# Patient Record
Sex: FEMALE | Race: WHITE | Employment: FULL TIME | URBAN - METROPOLITAN AREA
[De-identification: names, ages, dates, MRNs, and addresses within clinical notes are randomized per-mention and may not be internally consistent; named-entity substitution may affect disease eponyms.]

---

## 2017-03-23 ENCOUNTER — HOSPITAL ENCOUNTER (OUTPATIENT)
Dept: PREADMISSION TESTING | Age: 52
Discharge: HOME | End: 2017-03-23
Payer: COMMERCIAL

## 2017-03-23 LAB
BASOPHILS # BLD AUTO: 0.1 K/UL (ref 0–0.06)
BASOPHILS # BLD: 1 % (ref 0–2)
DIFFERENTIAL METHOD BLD: ABNORMAL
EOSINOPHIL # BLD: 0.2 K/UL (ref 0–0.4)
EOSINOPHIL NFR BLD: 3 % (ref 0–5)
ERYTHROCYTE [DISTWIDTH] IN BLOOD BY AUTOMATED COUNT: 16.1 % (ref 11.6–14.5)
HCT VFR BLD AUTO: 26.2 % (ref 35–45)
HGB BLD-MCNC: 8.2 G/DL (ref 12–16)
LYMPHOCYTES # BLD AUTO: 26 % (ref 21–52)
LYMPHOCYTES # BLD: 1.4 K/UL (ref 0.9–3.6)
MCH RBC QN AUTO: 28.5 PG (ref 24–34)
MCHC RBC AUTO-ENTMCNC: 31.3 G/DL (ref 31–37)
MCV RBC AUTO: 91 FL (ref 74–97)
MONOCYTES # BLD: 0.6 K/UL (ref 0.05–1.2)
MONOCYTES NFR BLD AUTO: 11 % (ref 3–10)
NEUTS SEG # BLD: 3.1 K/UL (ref 1.8–8)
NEUTS SEG NFR BLD AUTO: 59 % (ref 40–73)
PLATELET # BLD AUTO: 453 K/UL (ref 135–420)
PMV BLD AUTO: 9.5 FL (ref 9.2–11.8)
RBC # BLD AUTO: 2.88 M/UL (ref 4.2–5.3)
RPR SER QL: NONREACTIVE
WBC # BLD AUTO: 5.3 K/UL (ref 4.6–13.2)

## 2017-03-23 PROCEDURE — 36415 COLL VENOUS BLD VENIPUNCTURE: CPT | Performed by: OBSTETRICS & GYNECOLOGY

## 2017-03-23 PROCEDURE — 86900 BLOOD TYPING SEROLOGIC ABO: CPT | Performed by: OBSTETRICS & GYNECOLOGY

## 2017-03-23 PROCEDURE — 85025 COMPLETE CBC W/AUTO DIFF WBC: CPT | Performed by: OBSTETRICS & GYNECOLOGY

## 2017-03-23 PROCEDURE — 86592 SYPHILIS TEST NON-TREP QUAL: CPT | Performed by: OBSTETRICS & GYNECOLOGY

## 2017-03-24 LAB
ABO + RH BLD: NORMAL
BLOOD GROUP ANTIBODIES SERPL: NORMAL
SPECIMEN EXP DATE BLD: NORMAL

## 2017-03-27 NOTE — H&P
[address] [place] [address]  PRE-OP HISTORY AND PHYSICAL    Name:  Jose Eduardo Vicente  MR#:  [id number]  :  1965  Account #:  [de-identified]  Date of Adm:  2017      CHIEF COMPLAINT  1. Dysfunctional uterine bleeding. 2. Known late stage endometriosis. HISTORY OF PRESENT ILLNESS: The patient is a 78-year-old   2, para 2 by  section,  female who is in with  a history of increasing irregular bleeding, which has precluded her Pap  smear this year, but Pap smears in the past 2 years have all been  class 1 with negative cultures. Upon ultrasound, patient has a 9 cm  uterus, does have the possibility of adenomyosis versus a question of  a fibroid within the intramural area of the uterus and the fundus. Otherwise benign appearance. The blush which may be consistent with  adenomyosis is 2.9 cm. Left ovary was normal at 2.65 x 2.40 cm. The  right ovary showed a 2.98 cm benign cyst without tenderness or  irregularity and the cul-de-sac was without fluid. The endometrium was  slightly thickened. The patient is now in at this time for exam under  anesthesia, fractional dilatation and curettage of the uterus. The patient in  had a diagnostic and operative laser laparoscopy  and hysteroscopic resection of endometrial polyps. At that time  specimens from the uterus including the polyp lesions as well as  fractional D and C revealed evidence for chronic endometritis. The  patient at that time was also on antibiotic coverage. The patient was on  Ceftin and Vibra-Tabs. Because of the issues surrounding that finding,  the patient is now going to be on Ancef, Flagyl therapy for coverage of  this particular procedure. The patient is now in at this time for exam  under anesthesia, fractional dilatation and curettage of the uterus.     OTHER SIGNIFICANT PAST MEDICAL HISTORY: Two   sections, the last being in , laser laparoscopy in  with the  findings minimal scarification to the old bladder flap, uterus about 10.5  cm, right distal tube was removed because of what appeared to be  endometriosis and pathology did confirm distal tube having  endometriotic implants. There was enterolysis of the rectosigmoid area  on the left from endometriosis, and endometriosis left and right ovary  and polycystic ovaries were treated with lasers. There may be a  smaller residual at the left uterosacral ligament of the uterus, but it is  not tinting to the rectosigmoid area. The appendix was anteflexed and  gallbladder head and liver were otherwise normal. The patient currently  is having some menopausal symptoms as far as going for definitive  surgery. At the present, this is not desired at this time. The patient  does have tolerable night sweats. OTHER SIGNIFICANT HISTORY: Spouse's name is listed as Sigifredo Ch. It should be mentioned, the patient did have a urinary tract infection on  admission with her 2004 study. Urinalysis will be done at this time. If  necessary, this will also be treated postoperatively other than the  Ancef, Flagyl therapy as given for the prophylaxis. ALLERGIES: NO KNOWN DRUG ALLERGIES. FAMILY HISTORY: Grandmother  of breast cancer. Family history  of twins. SYSTEM REVIEW: Otherwise, noncontributory and/or normal except  for given above. PHYSICAL EXAMINATION  GENERAL: Well-developed, well-nourished,  female. VITAL SIGNS: 5 feet 3 inches tall, weight 169 pounds. Blood pressure  137/89. HEAD, EYE, EAR, NOSE, AND THROAT: Normal.  CHEST: Clear. BREASTS: Without extraneous masses. CARDIAC: Regular sinus rhythm without murmur. ABDOMEN: Reveals a well-healed Pfannenstiel incision site. PELVIC: Examination is as in the present illness.     The rest of the exam including neurologic is otherwise normal.        MD AVERY Doss / MARIA FERNANDA  D:  2017   10:27  T:  2017   11:00  Job #:  [id number]    Fax [phone]    Outpatient Surgery

## 2017-03-30 ENCOUNTER — ANESTHESIA EVENT (OUTPATIENT)
Dept: SURGERY | Age: 52
End: 2017-03-30
Payer: COMMERCIAL

## 2017-03-30 PROBLEM — N93.8 DUB (DYSFUNCTIONAL UTERINE BLEEDING): Status: ACTIVE | Noted: 2017-03-30

## 2017-03-30 PROBLEM — N92.4 MENORRHAGIA, PREMENOPAUSAL: Status: ACTIVE | Noted: 2017-03-30

## 2017-03-30 NOTE — DISCHARGE INSTRUCTIONS
DISCHARGE SUMMARY from Nurse    The following personal items are in your possession at time of discharge:    Dental Appliances: None  Visual Aid: Glasses, With patient     Home Medications: None  Jewelry: None  Clothing: Pants, Shirt, Undergarments, Footwear, Socks, Jacket/Coat (with Carla Sunny)  Other Valuables: Purse, Eyeglasses, Cell Phone (with Carlajess Correa)             PATIENT INSTRUCTIONS:    After general anesthesia or intravenous sedation, for 24 hours or while taking prescription Narcotics:  · Limit your activities  · Do not drive and operate hazardous machinery  · Do not make important personal or business decisions  · Do  not drink alcoholic beverages  · If you have not urinated within 8 hours after discharge, please contact your surgeon on call. Report the following to your surgeon:  · Excessive pain, swelling, redness or odor of or around the surgical area  · Temperature over 100.5  · Nausea and vomiting lasting longer than 4 hours or if unable to take medications  · Any signs of decreased circulation or nerve impairment to extremity: change in color, persistent  numbness, tingling, coldness or increase pain  · Any questions        What to do at Home:  Regular diet  Ok to shower  Pelvic rest for 2 weeks  Return to office in 2 weeks call for appt    If you experience any of the following symptoms heavy bleeding, fevers, severe pain, please follow up with dr Rufino Davalos. *  Please give a list of your current medications to your Primary Care Provider. *  Please update this list whenever your medications are discontinued, doses are      changed, or new medications (including over-the-counter products) are added. *  Please carry medication information at all times in case of emergency situations.           These are general instructions for a healthy lifestyle:    No smoking/ No tobacco products/ Avoid exposure to second hand smoke    Surgeon General's Warning:  Quitting smoking now greatly reduces serious risk to your health. Obesity, smoking, and sedentary lifestyle greatly increases your risk for illness    A healthy diet, regular physical exercise & weight monitoring are important for maintaining a healthy lifestyle    You may be retaining fluid if you have a history of heart failure or if you experience any of the following symptoms:  Weight gain of 3 pounds or more overnight or 5 pounds in a week, increased swelling in our hands or feet or shortness of breath while lying flat in bed. Please call your doctor as soon as you notice any of these symptoms; do not wait until your next office visit. Recognize signs and symptoms of STROKE:    F-face looks uneven    A-arms unable to move or move unevenly    S-speech slurred or non-existent    T-time-call 911 as soon as signs and symptoms begin-DO NOT go       Back to bed or wait to see if you get better-TIME IS BRAIN. Warning Signs of HEART ATTACK     Call 911 if you have these symptoms:   Chest discomfort. Most heart attacks involve discomfort in the center of the chest that lasts more than a few minutes, or that goes away and comes back. It can feel like uncomfortable pressure, squeezing, fullness, or pain.  Discomfort in other areas of the upper body. Symptoms can include pain or discomfort in one or both arms, the back, neck, jaw, or stomach.  Shortness of breath with or without chest discomfort.  Other signs may include breaking out in a cold sweat, nausea, or lightheadedness. Don't wait more than five minutes to call 911 - MINUTES MATTER! Fast action can save your life. Calling 911 is almost always the fastest way to get lifesaving treatment. Emergency Medical Services staff can begin treatment when they arrive -- up to an hour sooner than if someone gets to the hospital by car. The discharge information has been reviewed with the patient and caregiver. The patient and caregiver verbalized understanding.     Discharge medications reviewed with the patient and caregiver and appropriate educational materials and side effects teaching were provided. Dilation and Curettage (D&C): What to Expect at Home  Your Recovery  Dilation and curettage (D&C) is a procedure to remove tissue from the inside of the uterus. The doctor used a curved tool, called a curette, to gently scrape tissue from your uterus. You are likely to have a backache, or cramps similar to menstrual cramps, and pass small clots of blood from your vagina for the first few days. You may continue to have light vaginal bleeding for several weeks after the procedure. You will probably be able to go back to most of your normal activities in 1 or 2 days. This care sheet gives you a general idea about how long it will take for you to recover. But each person recovers at a different pace. Follow the steps below to get better as quickly as possible. How can you care for yourself at home? Activity  · Rest when you feel tired. Getting enough sleep will help you recover. · Avoid strenuous activities, such as bicycle riding, jogging, weight lifting, or aerobic exercise, until your doctor says it is okay. · Most women are able to return to work the day after the procedure. · You may have some light vaginal bleeding. Wear sanitary pads if needed. Do not douche or use tampons for 2 weeks or until your doctor says it is okay. · Ask your doctor when it is okay for you to have sex. · If you could become pregnant, talk about birth control with your doctor. Do not try to become pregnant until your doctor says it is okay. Diet  · You can eat your normal diet. If your stomach is upset, try bland, low-fat foods like plain rice, broiled chicken, toast, and yogurt. · Drink plenty of fluids (unless your doctor tells you not to). Medicines  · Take pain medicines exactly as directed. ¨ If the doctor gave you a prescription medicine for pain, take it as prescribed.   ¨ If you are not taking a prescription pain medicine, ask your doctor if you can take an over-the-counter medicine. · If you think your pain medicine is making you sick to your stomach:  ¨ Take your medicine after meals (unless your doctor has told you not to). ¨ Ask your doctor for a different pain medicine. · If your doctor prescribed antibiotics, take them as directed. Do not stop taking them just because you feel better. You need to take the full course of antibiotics. Follow-up care is a key part of your treatment and safety. Be sure to make and go to all appointments, and call your doctor if you are having problems. Its also a good idea to know your test results and keep a list of the medicines you take. When should you call for help? Call 911 anytime you think you may need emergency care. For example, call if:  · You passed out (lost consciousness). · You have severe trouble breathing. · You have chest pain and shortness of breath, or you cough up blood. · You have severe pain in your belly. Call your doctor now or seek immediate medical care if:  · You have bright red vaginal bleeding that soaks one or more pads each hour for 2 or more hours. · You pass blood clots that are larger than a golf ball. · You have vaginal discharge that smells bad. · You are sick to your stomach or cannot keep fluids down. · You have pain that does not get better after you take pain medicine. · You have pain that is getting worse 2 days after the procedure. · You have a fever over 100°F.  · Your belly feels tender, or full and hard. Watch closely for changes in your health, and be sure to contact your doctor if:  · You do not get better as expected. Where can you learn more? Go to [employer]  Enter D453 in the search box to learn more about \"Dilation and Curettage (D&C): What to Expect at Home. \"   © 5999-9707 [employer].  Care instructions adapted under license by [facility] (which disclaims liability or warranty for this information). This care instruction is for use with your licensed healthcare professional. If you have questions about a medical condition or this instruction, always ask your healthcare professional. [address] any warranty or liability for your use of this information.   Content Version: 2.7.573364; Last Revised: February 19, 2013    Patient armband removed and shredded

## 2017-03-30 NOTE — INTERVAL H&P NOTE
H&P Update:  Chester Horton was seen and examined. History and physical has been reviewed.  Significant clinical changes have occurred as noted:  hct 26%,hgb 8.2/ 5300=wbc yxay=258s    Signed By: Jeffrey Rodarte MD     March 30, 2017 11:58 AM

## 2017-03-31 ENCOUNTER — HOSPITAL ENCOUNTER (OUTPATIENT)
Age: 52
Setting detail: OUTPATIENT SURGERY
Discharge: HOME | End: 2017-03-31
Attending: OBSTETRICS & GYNECOLOGY | Admitting: OBSTETRICS & GYNECOLOGY
Payer: COMMERCIAL

## 2017-03-31 ENCOUNTER — ANESTHESIA (OUTPATIENT)
Dept: SURGERY | Age: 52
End: 2017-03-31
Payer: COMMERCIAL

## 2017-03-31 ENCOUNTER — SURGERY (OUTPATIENT)
Age: 52
End: 2017-03-31

## 2017-03-31 VITALS
SYSTOLIC BLOOD PRESSURE: 117 MMHG | HEART RATE: 70 BPM | RESPIRATION RATE: 12 BRPM | HEIGHT: 63 IN | BODY MASS INDEX: 29.98 KG/M2 | WEIGHT: 169.2 LBS | TEMPERATURE: 97.4 F | DIASTOLIC BLOOD PRESSURE: 72 MMHG | OXYGEN SATURATION: 100 %

## 2017-03-31 PROBLEM — D25.9 FIBROID UTERUS: Status: RESOLVED | Noted: 2017-03-31 | Resolved: 2017-03-31

## 2017-03-31 PROBLEM — N93.8 DUB (DYSFUNCTIONAL UTERINE BLEEDING): Status: RESOLVED | Noted: 2017-03-30 | Resolved: 2017-03-31

## 2017-03-31 PROBLEM — D25.9 FIBROID UTERUS: Status: ACTIVE | Noted: 2017-03-31

## 2017-03-31 LAB
BASOPHILS # BLD AUTO: 0 K/UL (ref 0–0.06)
BASOPHILS # BLD: 1 % (ref 0–2)
DIFFERENTIAL METHOD BLD: ABNORMAL
EOSINOPHIL # BLD: 0.1 K/UL (ref 0–0.4)
EOSINOPHIL NFR BLD: 3 % (ref 0–5)
ERYTHROCYTE [DISTWIDTH] IN BLOOD BY AUTOMATED COUNT: 15.3 % (ref 11.6–14.5)
HCG SERPL QL: NEGATIVE
HCT VFR BLD AUTO: 24.3 % (ref 35–45)
HGB BLD-MCNC: 7.3 G/DL (ref 12–16)
LYMPHOCYTES # BLD AUTO: 18 % (ref 21–52)
LYMPHOCYTES # BLD: 0.7 K/UL (ref 0.9–3.6)
MCH RBC QN AUTO: 26.4 PG (ref 24–34)
MCHC RBC AUTO-ENTMCNC: 30 G/DL (ref 31–37)
MCV RBC AUTO: 87.7 FL (ref 74–97)
MONOCYTES # BLD: 0.5 K/UL (ref 0.05–1.2)
MONOCYTES NFR BLD AUTO: 11 % (ref 3–10)
NEUTS SEG # BLD: 2.7 K/UL (ref 1.8–8)
NEUTS SEG NFR BLD AUTO: 67 % (ref 40–73)
PLATELET # BLD AUTO: 308 K/UL (ref 135–420)
PMV BLD AUTO: 9.2 FL (ref 9.2–11.8)
RBC # BLD AUTO: 2.77 M/UL (ref 4.2–5.3)
WBC # BLD AUTO: 4 K/UL (ref 4.6–13.2)

## 2017-03-31 PROCEDURE — 74011250636 HC RX REV CODE- 250/636: Performed by: ANESTHESIOLOGY

## 2017-03-31 PROCEDURE — 77030005537 HC CATH URETH BARD -A: Performed by: OBSTETRICS & GYNECOLOGY

## 2017-03-31 PROCEDURE — 74011250636 HC RX REV CODE- 250/636

## 2017-03-31 PROCEDURE — 85025 COMPLETE CBC W/AUTO DIFF WBC: CPT | Performed by: OBSTETRICS & GYNECOLOGY

## 2017-03-31 PROCEDURE — 76210000016 HC OR PH I REC 1 TO 1.5 HR: Performed by: OBSTETRICS & GYNECOLOGY

## 2017-03-31 PROCEDURE — 77030012508 HC MSK AIRWY LMA AMBU -A: Performed by: ANESTHESIOLOGY

## 2017-03-31 PROCEDURE — 76210000021 HC REC RM PH II 0.5 TO 1 HR: Performed by: OBSTETRICS & GYNECOLOGY

## 2017-03-31 PROCEDURE — 76010000138 HC OR TIME 0.5 TO 1 HR: Performed by: OBSTETRICS & GYNECOLOGY

## 2017-03-31 PROCEDURE — 76060000032 HC ANESTHESIA 0.5 TO 1 HR: Performed by: OBSTETRICS & GYNECOLOGY

## 2017-03-31 PROCEDURE — 88305 TISSUE EXAM BY PATHOLOGIST: CPT | Performed by: OBSTETRICS & GYNECOLOGY

## 2017-03-31 PROCEDURE — 74011250636 HC RX REV CODE- 250/636: Performed by: OBSTETRICS & GYNECOLOGY

## 2017-03-31 PROCEDURE — 84703 CHORIONIC GONADOTROPIN ASSAY: CPT | Performed by: OBSTETRICS & GYNECOLOGY

## 2017-03-31 PROCEDURE — 77030020782 HC GWN BAIR PAWS FLX 3M -B: Performed by: OBSTETRICS & GYNECOLOGY

## 2017-03-31 PROCEDURE — 36415 COLL VENOUS BLD VENIPUNCTURE: CPT | Performed by: OBSTETRICS & GYNECOLOGY

## 2017-03-31 RX ORDER — DEXAMETHASONE SODIUM PHOSPHATE 4 MG/ML
INJECTION, SOLUTION INTRA-ARTICULAR; INTRALESIONAL; INTRAMUSCULAR; INTRAVENOUS; SOFT TISSUE AS NEEDED
Status: DISCONTINUED | OUTPATIENT
Start: 2017-03-31 | End: 2017-03-31 | Stop reason: HOSPADM

## 2017-03-31 RX ORDER — MIDAZOLAM HYDROCHLORIDE 1 MG/ML
INJECTION, SOLUTION INTRAMUSCULAR; INTRAVENOUS AS NEEDED
Status: DISCONTINUED | OUTPATIENT
Start: 2017-03-31 | End: 2017-03-31 | Stop reason: HOSPADM

## 2017-03-31 RX ORDER — PROPOFOL 10 MG/ML
INJECTION, EMULSION INTRAVENOUS AS NEEDED
Status: DISCONTINUED | OUTPATIENT
Start: 2017-03-31 | End: 2017-03-31 | Stop reason: HOSPADM

## 2017-03-31 RX ORDER — KETOROLAC TROMETHAMINE 30 MG/ML
INJECTION, SOLUTION INTRAMUSCULAR; INTRAVENOUS AS NEEDED
Status: DISCONTINUED | OUTPATIENT
Start: 2017-03-31 | End: 2017-03-31 | Stop reason: HOSPADM

## 2017-03-31 RX ORDER — ALBUTEROL SULFATE 0.83 MG/ML
2.5 SOLUTION RESPIRATORY (INHALATION) AS NEEDED
Status: DISCONTINUED | OUTPATIENT
Start: 2017-03-31 | End: 2017-03-31 | Stop reason: HOSPADM

## 2017-03-31 RX ORDER — FENTANYL CITRATE 50 UG/ML
INJECTION, SOLUTION INTRAMUSCULAR; INTRAVENOUS AS NEEDED
Status: DISCONTINUED | OUTPATIENT
Start: 2017-03-31 | End: 2017-03-31 | Stop reason: HOSPADM

## 2017-03-31 RX ORDER — DIPHENHYDRAMINE HYDROCHLORIDE 50 MG/ML
12.5 INJECTION, SOLUTION INTRAMUSCULAR; INTRAVENOUS
Status: DISCONTINUED | OUTPATIENT
Start: 2017-03-31 | End: 2017-03-31 | Stop reason: HOSPADM

## 2017-03-31 RX ORDER — DEXTROSE 50 % IN WATER (D50W) INTRAVENOUS SYRINGE
25-50 AS NEEDED
Status: DISCONTINUED | OUTPATIENT
Start: 2017-03-31 | End: 2017-03-31 | Stop reason: HOSPADM

## 2017-03-31 RX ORDER — CEFAZOLIN SODIUM 2 G/50ML
2 SOLUTION INTRAVENOUS ONCE
Status: COMPLETED | OUTPATIENT
Start: 2017-03-31 | End: 2017-03-31

## 2017-03-31 RX ORDER — SODIUM CHLORIDE, SODIUM LACTATE, POTASSIUM CHLORIDE, CALCIUM CHLORIDE 600; 310; 30; 20 MG/100ML; MG/100ML; MG/100ML; MG/100ML
1000 INJECTION, SOLUTION INTRAVENOUS CONTINUOUS
Status: DISCONTINUED | OUTPATIENT
Start: 2017-03-31 | End: 2017-03-31 | Stop reason: HOSPADM

## 2017-03-31 RX ORDER — HYDROMORPHONE HYDROCHLORIDE 1 MG/ML
0.5 INJECTION, SOLUTION INTRAMUSCULAR; INTRAVENOUS; SUBCUTANEOUS
Status: DISCONTINUED | OUTPATIENT
Start: 2017-03-31 | End: 2017-03-31 | Stop reason: HOSPADM

## 2017-03-31 RX ORDER — MAGNESIUM SULFATE 100 %
4 CRYSTALS MISCELLANEOUS AS NEEDED
Status: DISCONTINUED | OUTPATIENT
Start: 2017-03-31 | End: 2017-03-31 | Stop reason: HOSPADM

## 2017-03-31 RX ORDER — FENTANYL CITRATE 50 UG/ML
25 INJECTION, SOLUTION INTRAMUSCULAR; INTRAVENOUS AS NEEDED
Status: DISCONTINUED | OUTPATIENT
Start: 2017-03-31 | End: 2017-03-31 | Stop reason: HOSPADM

## 2017-03-31 RX ORDER — NALOXONE HYDROCHLORIDE 0.4 MG/ML
0.2 INJECTION, SOLUTION INTRAMUSCULAR; INTRAVENOUS; SUBCUTANEOUS AS NEEDED
Status: DISCONTINUED | OUTPATIENT
Start: 2017-03-31 | End: 2017-03-31 | Stop reason: HOSPADM

## 2017-03-31 RX ORDER — FLUMAZENIL 0.1 MG/ML
0.2 INJECTION INTRAVENOUS
Status: DISCONTINUED | OUTPATIENT
Start: 2017-03-31 | End: 2017-03-31 | Stop reason: HOSPADM

## 2017-03-31 RX ORDER — METRONIDAZOLE 500 MG/100ML
500 INJECTION, SOLUTION INTRAVENOUS ONCE
Status: DISCONTINUED | OUTPATIENT
Start: 2017-03-31 | End: 2017-03-31 | Stop reason: HOSPADM

## 2017-03-31 RX ORDER — SODIUM CHLORIDE, SODIUM LACTATE, POTASSIUM CHLORIDE, CALCIUM CHLORIDE 600; 310; 30; 20 MG/100ML; MG/100ML; MG/100ML; MG/100ML
125 INJECTION, SOLUTION INTRAVENOUS CONTINUOUS
Status: DISCONTINUED | OUTPATIENT
Start: 2017-03-31 | End: 2017-03-31 | Stop reason: HOSPADM

## 2017-03-31 RX ORDER — SODIUM CHLORIDE 0.9 % (FLUSH) 0.9 %
5-10 SYRINGE (ML) INJECTION AS NEEDED
Status: DISCONTINUED | OUTPATIENT
Start: 2017-03-31 | End: 2017-03-31 | Stop reason: HOSPADM

## 2017-03-31 RX ORDER — ONDANSETRON 2 MG/ML
INJECTION INTRAMUSCULAR; INTRAVENOUS AS NEEDED
Status: DISCONTINUED | OUTPATIENT
Start: 2017-03-31 | End: 2017-03-31 | Stop reason: HOSPADM

## 2017-03-31 RX ADMIN — MIDAZOLAM HYDROCHLORIDE 2 MG: 1 INJECTION, SOLUTION INTRAMUSCULAR; INTRAVENOUS at 08:13

## 2017-03-31 RX ADMIN — FENTANYL CITRATE 25 MCG: 50 INJECTION, SOLUTION INTRAMUSCULAR; INTRAVENOUS at 08:34

## 2017-03-31 RX ADMIN — FENTANYL CITRATE 25 MCG: 50 INJECTION, SOLUTION INTRAMUSCULAR; INTRAVENOUS at 09:50

## 2017-03-31 RX ADMIN — SODIUM CHLORIDE: 900 INJECTION, SOLUTION INTRAVENOUS at 08:49

## 2017-03-31 RX ADMIN — FENTANYL CITRATE 50 MCG: 50 INJECTION, SOLUTION INTRAMUSCULAR; INTRAVENOUS at 08:21

## 2017-03-31 RX ADMIN — SODIUM CHLORIDE, SODIUM LACTATE, POTASSIUM CHLORIDE, AND CALCIUM CHLORIDE 125 ML/HR: 600; 310; 30; 20 INJECTION, SOLUTION INTRAVENOUS at 07:34

## 2017-03-31 RX ADMIN — KETOROLAC TROMETHAMINE 30 MG: 30 INJECTION, SOLUTION INTRAMUSCULAR; INTRAVENOUS at 08:48

## 2017-03-31 RX ADMIN — FENTANYL CITRATE 25 MCG: 50 INJECTION, SOLUTION INTRAMUSCULAR; INTRAVENOUS at 09:55

## 2017-03-31 RX ADMIN — DEXAMETHASONE SODIUM PHOSPHATE 4 MG: 4 INJECTION, SOLUTION INTRA-ARTICULAR; INTRALESIONAL; INTRAMUSCULAR; INTRAVENOUS; SOFT TISSUE at 08:31

## 2017-03-31 RX ADMIN — CEFAZOLIN SODIUM 2 G: 2 SOLUTION INTRAVENOUS at 08:14

## 2017-03-31 RX ADMIN — ONDANSETRON 4 MG: 2 INJECTION INTRAMUSCULAR; INTRAVENOUS at 08:31

## 2017-03-31 RX ADMIN — FENTANYL CITRATE 25 MCG: 50 INJECTION, SOLUTION INTRAMUSCULAR; INTRAVENOUS at 09:39

## 2017-03-31 RX ADMIN — FENTANYL CITRATE 25 MCG: 50 INJECTION, SOLUTION INTRAMUSCULAR; INTRAVENOUS at 08:49

## 2017-03-31 RX ADMIN — FENTANYL CITRATE 25 MCG: 50 INJECTION, SOLUTION INTRAMUSCULAR; INTRAVENOUS at 09:45

## 2017-03-31 RX ADMIN — PROPOFOL 150 MG: 10 INJECTION, EMULSION INTRAVENOUS at 08:21

## 2017-03-31 NOTE — OP NOTES
Operative Note      Patient: Russell Gamez               Sex: female          DOA: 3/31/2017         YOB: 1965      Age:  45 y.o.        LOS:  LOS: 0 days     Preoperative Diagnosis: DYSFUNCTIONAL UTERINE BLEEDING,PELVIC PAIN    Postoperative Diagnosis:  DYSFUNCTIONAL UTERINE BLEEDING,PELVIC PAIN, FIBRIOD UTERUS    Surgeon: Surgeon(s) and Role:     * Kandy Jean Baptiste MD - Primary    Anesthesia:  General    Estimated Blood Loss:  100cc    Estimated Blood Replacement:  o    Estimated Fluid Absorption: 0    Procedure:  Procedure(s):  DILATATION AND CURETTAGE     Procedure in Detail: See dictation ticket #[id number]    Findings:    Uterus:14 cm fibroid uterus   Tubes:    Right: not palp      Left gary   Ovaries:      Right: gary    Left: gary   Appi:  na   GB Head: na   Liver: na   ABD: uterus palp at symphesis   Pelvis: filled with uterus mobile no descent                 Photo Documentation:  See Chart Review, Media Tab this date 3/31/2017 for photo documentation na. Implants: * No implants in log *  1. Specimens:   ID Type Source Tests Collected by Time Destination   1 : ENDOCERVICAL CURETTINGS Preservative Uterus  Kandy Jean Baptiste MD 3/31/2017 6322 Pathology   2 : ENDOMETRIAL CURETTINGS Preservative Uterus  Kandy Jean Baptiste MD 3/31/2017 7367 Pathology        Drains: na           Complications:  o           Counts: Sponge and needle counts were correct times two.     Prophylaxis:   Ancef flagyl    Patient Status Op end: stable

## 2017-03-31 NOTE — ANESTHESIA POSTPROCEDURE EVALUATION
Post-Anesthesia Evaluation & Assessment    Visit Vitals    /62    Pulse 76    Temp 36.4 °C (97.5 °F)    Resp 10    Ht 5' 3\" (1.6 m)    Wt 76.7 kg (169 lb 3.2 oz)    SpO2 96%    BMI 29.97 kg/m2       No untreated/active PONV    Post-operative hydration adequate. Adequate post-operative analgesia per PACU discharge criteria    Mental status & level of consciousness: alert and oriented x 3    Respiratory status: patent unassisted airway     No apparent anesthetic complications requiring additional post-anesthetic care    Patient has met all discharge requirements.             Pratik Thacker MD

## 2017-03-31 NOTE — PERIOP NOTES
TRANSFER - OUT REPORT:    Verbal report given to KARIME Corrales on Rashad Vegas  being transferred to phase 2 (unit) for routine post - op       Report consisted of patients Situation, Background, Assessment and   Recommendations(SBAR). Information from the following report(s) SBAR, Intake/Output and MAR was reviewed with the receiving nurse. Lines:   Peripheral IV 03/31/17 Left Hand (Active)   Site Assessment Clean, dry, & intact 3/31/2017  9:52 AM   Phlebitis Assessment 0 3/31/2017  9:52 AM   Infiltration Assessment 0 3/31/2017  9:52 AM   Dressing Status Clean, dry, & intact 3/31/2017  9:52 AM   Dressing Type Tape 3/31/2017  9:52 AM   Hub Color/Line Status Pink; Infusing 3/31/2017  9:52 AM        Opportunity for questions and clarification was provided.       Patient transported with:   [employer]

## 2017-03-31 NOTE — ANESTHESIA PREPROCEDURE EVALUATION
Anesthetic History     PONV          Review of Systems / Medical History  Patient summary reviewed, nursing notes reviewed and pertinent labs reviewed    Pulmonary  Within defined limits                 Neuro/Psych   Within defined limits           Cardiovascular  Within defined limits                Exercise tolerance: >4 METS     GI/Hepatic/Renal  Within defined limits              Endo/Other        Anemia     Other Findings              Physical Exam    Airway  Mallampati: II  TM Distance: 4 - 6 cm  Neck ROM: normal range of motion   Mouth opening: Normal     Cardiovascular  Regular rate and rhythm,  S1 and S2 normal,  no murmur, click, rub, or gallop             Dental  No notable dental hx       Pulmonary  Breath sounds clear to auscultation               Abdominal  GI exam deferred       Other Findings            Anesthetic Plan    ASA: 2  Anesthesia type: general          Induction: Intravenous  Anesthetic plan and risks discussed with: Patient

## 2017-03-31 NOTE — INTERVAL H&P NOTE
H&P Update:  Ebb Lorenza was seen and examined. History and physical has been reviewed. Significant clinical changes have occurred as noted:  hgb 8.2 for stat repeat cbc preop. Other than hgb hx and pe uc.from dictation.     Signed By: Yariel Godinez MD     March 31, 2017 6:30 AM

## 2017-03-31 NOTE — IP AVS SNAPSHOT
[address] [place] [address] [address] [address] 
[phone] Patient: Cynthia Mccabe MRN: [id number] :1965 You are allergic to the following No active allergies Recent Documentation Height Weight BMI OB Status Smoking Status 1.6 m 76.7 kg 29.97 kg/m2 Having regular periods Former Smoker Emergency Contacts Name Discharge Info Relation Home Work Mobile Tu Hatfield DISCHARGE CAREGIVER [3] Spouse [3]   [phone] About your hospitalization You were admitted on:  2017 You last received care in the:  [facility] PACU You were discharged on:  2017 Unit phone number:  [phone] Why you were hospitalized Your primary diagnosis was:  Not on File Your diagnoses also included:  Dub (Dysfunctional Uterine Bleeding), Menorrhagia, Premenopausal, Fibroid Uterus Providers Seen During Your Hospitalizations Provider Role Specialty Primary office phone Awilda Ridley MD Attending Provider Obstetrics & Gynecology [phone] Your Primary Care Physician (PCP) Primary Care Physician Office Phone Office Fax 1 [facility], [address] [phone] Follow-up Information Follow up With Details Comments Contact Info Sergio Monahan MD   [address] [place] [address] [phone] Current Discharge Medication List  
  
CONTINUE these medications which have NOT CHANGED Dose & Instructions Dispensing Information Comments Morning Noon Evening Bedtime Calcium-Cholecalciferol (D3) 600 mg(1,500mg) -400 unit Cap Your last dose was: Your next dose is: Take  by mouth daily. Refills:  0 Iron 325 mg (65 mg iron) tablet Generic drug:  ferrous sulfate Your last dose was: Your next dose is: Take  by mouth Daily (before breakfast). Indications: IRON DEFICIENCY ANEMIA Refills:  0 Discharge Instructions DISCHARGE SUMMARY from Nurse The following personal items are in your possession at time of discharge: 
 
Dental Appliances: None Visual Aid: Glasses, With patient Home Medications: None Jewelry: None Clothing: Pants, Shirt, Undergarments, Footwear, Socks, Jacket/Coat (with Hebo) Other Valuables: Peter Callaway, Cell Phone (with Hebo) PATIENT INSTRUCTIONS: 
 
 
F-face looks uneven A-arms unable to move or move unevenly S-speech slurred or non-existent T-time-call 911 as soon as signs and symptoms begin-DO NOT go Back to bed or wait to see if you get better-TIME IS BRAIN. Warning Signs of HEART ATTACK Call 911 if you have these symptoms: 
? Chest discomfort. Most heart attacks involve discomfort in the center of the chest that lasts more than a few minutes, or that goes away and comes back. It can feel like uncomfortable pressure, squeezing, fullness, or pain. ? Discomfort in other areas of the upper body. Symptoms can include pain or discomfort in one or both arms, the back, neck, jaw, or stomach. ? Shortness of breath with or without chest discomfort. ? Other signs may include breaking out in a cold sweat, nausea, or lightheadedness. Don't wait more than five minutes to call [email or web address]! Fast action can save your life. Calling 911 is almost always the fastest way to get lifesaving treatment.  Emergency Medical Services staff can begin treatment when they arrive  up to an hour sooner than if someone gets to the hospital by car. The discharge information has been reviewed with the patient and caregiver. The patient and caregiver verbalized understanding. Discharge medications reviewed with the patient and caregiver and appropriate educational materials and side effects teaching were provided. Dilation and Curettage (D&C): What to Expect at [facility] Your Recovery Dilation and curettage (D&C) is a procedure to remove tissue from the inside of the uterus. The doctor used a curved tool, called a curette, to gently scrape tissue from your uterus. You are likely to have a backache, or cramps similar to menstrual cramps, and pass small clots of blood from your vagina for the first few days. You may continue to have light vaginal bleeding for several weeks after the procedure. You will probably be able to go back to most of your normal activities in 1 or 2 days. This care sheet gives you a general idea about how long it will take for you to recover. But each person recovers at a different pace. Follow the steps below to get better as quickly as possible. How can you care for yourself at home? Activity · Rest when you feel tired. Getting enough sleep will help you recover. · Avoid strenuous activities, such as bicycle riding, jogging, weight lifting, or aerobic exercise, until your doctor says it is okay. · Most women are able to return to work the day after the procedure. · You may have some light vaginal bleeding. Wear sanitary pads if needed. Do not douche or use tampons for 2 weeks or until your doctor says it is okay. · Ask your doctor when it is okay for you to have sex. · If you could become pregnant, talk about birth control with your doctor. Do not try to become pregnant until your doctor says it is okay. Diet · You can eat your normal diet.  If your stomach is upset, try bland, low-fat foods like plain rice, broiled chicken, toast, and yogurt. · Drink plenty of fluids (unless your doctor tells you not to). Medicines · Take pain medicines exactly as directed. ¨ If the doctor gave you a prescription medicine for pain, take it as prescribed. ¨ If you are not taking a prescription pain medicine, ask your doctor if you can take an over-the-counter medicine. · If you think your pain medicine is making you sick to your stomach: 
¨ Take your medicine after meals (unless your doctor has told you not to). ¨ Ask your doctor for a different pain medicine. · If your doctor prescribed antibiotics, take them as directed. Do not stop taking them just because you feel better. You need to take the full course of antibiotics. Follow-up care is a key part of your treatment and safety. Be sure to make and go to all appointments, and call your doctor if you are having problems. Its also a good idea to know your test results and keep a list of the medicines you take. When should you call for help? Call 911 anytime you think you may need emergency care. For example, call if: 
· You passed out (lost consciousness). · You have severe trouble breathing. · You have chest pain and shortness of breath, or you cough up blood. · You have severe pain in your belly. Call your doctor now or seek immediate medical care if: 
· You have bright red vaginal bleeding that soaks one or more pads each hour for 2 or more hours. · You pass blood clots that are larger than a golf ball. · You have vaginal discharge that smells bad. · You are sick to your stomach or cannot keep fluids down. · You have pain that does not get better after you take pain medicine. · You have pain that is getting worse 2 days after the procedure. · You have a fever over 100°F. 
· Your belly feels tender, or full and hard. Watch closely for changes in your health, and be sure to contact your doctor if: · You do not get better as expected. Where can you learn more? Go to [employer] Enter [phone] in the search box to learn more about \"Dilation and Curettage (D&C): What to Expect at Home. \"  
© 0507-4181 Healthwise, Incorporated. Care instructions adapted under license by Paige Barker (which disclaims liability or warranty for this information). This care instruction is for use with your licensed healthcare professional. If you have questions about a medical condition or this instruction, always ask your healthcare professional. [address] any warranty or liability for your use of this information. Content Version: 5.9.345100; Last Revised: February 19, 2013 Patient armband removed and shredded Discharge Orders None Slidebean Announcement We are excited to announce that we are making your provider's discharge notes available to you in Slidebean. You will see these notes when they are completed and signed by the physician that discharged you from your recent hospital stay. If you have any questions or concerns about any information you see in Slidebean, please call the Health Information Department where you were seen or reach out to your Primary Care Provider for more information about your plan of care. Introducing [facility]! Paige Barker introduces Slidebean patient portal. Now you can access parts of your medical record, email your doctor's office, and request medication refills online. 1. In your internet browser, go to [email or web address] 2. Click on the First Time User? Click Here link in the Sign In box. You will see the New Member Sign Up page. 3. Enter your Slidebean Access Code exactly as it appears below. You will not need to use this code after youve completed the sign-up process. If you do not sign up before the expiration date, you must request a new code. · betaworks Access Code: [email or web address] Expires: 6/19/2017 10:37 AM 
 
4. Enter the last four digits of your Social Security Number (xxxx) and Date of Birth (mm/dd/yyyy) as indicated and click Submit. You will be taken to the next sign-up page. 5. Create a betaworks ID. This will be your betaworks login ID and cannot be changed, so think of one that is secure and easy to remember. 6. Create a betaworks password. You can change your password at any time. 7. Enter your Password Reset Question and Answer. This can be used at a later time if you forget your password. 8. Enter your e-mail address. You will receive e-mail notification when new information is available in [address] 9. Click Sign Up. You can now view and download portions of your medical record. 10. Click the Download Summary menu link to download a portable copy of your medical information. If you have questions, please visit the Frequently Asked Questions section of the betaworks website. Remember, betaworks is NOT to be used for urgent needs. For medical emergencies, dial 911. Now available from your iPhone and Android! General Information Please provide this summary of care documentation to your next provider. Patient Signature:  ____________________________________________________________ Date:  ____________________________________________________________  
  
Ritu Cobian Provider Signature:  ____________________________________________________________ Date:  ____________________________________________________________

## 2017-06-29 NOTE — H&P
[address] [place] [address]  PRE-OP HISTORY AND PHYSICAL    Name:  Rome Huffman  MR#:  [id number]  :  1965  Account #:  [de-identified]  Date of Adm:  2017      Outpatient surgery for admission 2017. She will be coming in at 6  a.m. To [place] [address]: Known late stage endometriosis, dysfunctional  uterine bleeding and known fibroid uterus. HISTORY OF PRESENT ILLNESS: The patient is a 58-year-old   2, para 2 by  section  female who is in with  normal Pap smears, all being class 1. Uterus, however, did have  dysfunctional bleeding which did require a therapeutic benefit of a D  and C as she had dropped her blood count significantly and is on iron  infusions to raise her blood count and stabilize her cardiovascular  system and now after 2 months is ready for the primary surgery which  will be a total abdominal hysterectomy in conservation of ovaries if and  where possible. During the course of the evaluation, at the time of the  D and C, the patient had a 14 cm uterus and had multiple fibroids,  some being submucosal. These had been read in  by ultrasound at  [facility] as being benign. The patient is also going to have a  followup [facility]), fibroid morphology scan  on the 2017, that report is pending. Pathology on the fractional D  and C of the uterus revealed negative for carcinoma or hyperplasia. Other significant past medical history: Ovaries are benign and of normal  size bilaterally on ultrasound and on ultrasound, there is only slight  thickness to the endometrium, but no other irregularities prior to the D  and C. The patient, at the time of the D and C, had cefepime and  Vibra-Tabs prophylaxis.  The patient has had a history of 2   sections, last being in , laparoscopy in  with the findings of  minimal endometriosis on an old bladder flap measuring about 10.5 cm at that  time. Pathology did confirm that the patient did have endometriotic  implants. There was enterolysis of the rectosigmoid area from the left  side from the endometriosis. Endometriosis of the left and right ovary  with treated with co2 laser. There is a small residual of the uterosacral   endometriosis on the uterosacral ligament of the uterus. It is not on  the rectosigmoid area. The appendix was anteflexed and normal.  Gallbladder head and liver are otherwise normal. The patient is having  some menopausal symptoms, but nothing of significance. The patient  does have tolerable night sweats. Spouse's name is Troy Meraz. The  patient does have a history of urinary tract infections in the past.  Urinalysis done at this time and with her preops during 2017 were  benign. The patient has no known drug allergies. FAMILY HISTORY: Grandmother  of breast cancer. Family history  of twins. REVIEW OF SYSTEMS  Otherwise noncontributory except what is given above. PHYSICAL EXAMINATION  GENERAL: Well-developed, well-nourished  female 5 feet 3  inches tall, 169 pounds. VITAL SIGNS: Blood pressure 137/89. HEAD, EYES, EARS, NOSE AND THROAT: Clear. Breasts: Without masses. CARDIAC: Normal.  ABDOMEN: REveals a well-healed Pfannenstiel incision site. PELVIC: Examination is as in the present illness. NEUROLOGIC: Otherwise, normal.    LABORATORY DATA: On 2017, O+ blood type on type and  screen. CBC at that time was 26.2,  and a white count of 5300,  platelet count of 149,479. As mentioned, the patient is currently on iron  infusion. This is through [place]. The patient's urinalysis was  normal and had a nonreactive DDRL in 2017. The patient does understand that if indeed we do have remove the  ovaries, we will try to conserves if we can possibly do so. The patient  has a history of a mammogram being normal this past year.         Linna Canavan, MD    RS / CD  D:  2017 12:45  T:  06/29/2017   15:13  Job #:  [id number]

## 2017-07-07 ENCOUNTER — HOSPITAL ENCOUNTER (OUTPATIENT)
Dept: PREADMISSION TESTING | Age: 52
Discharge: HOME | End: 2017-07-07
Payer: COMMERCIAL

## 2017-07-07 LAB
ABO + RH BLD: NORMAL
ALBUMIN SERPL BCP-MCNC: 3.6 G/DL (ref 3.4–5)
ALBUMIN/GLOB SERPL: 1.2 {RATIO} (ref 0.8–1.7)
ALP SERPL-CCNC: 53 U/L (ref 45–117)
ALT SERPL-CCNC: 22 U/L (ref 13–56)
ANION GAP BLD CALC-SCNC: 6 MMOL/L (ref 3–18)
APPEARANCE UR: ABNORMAL
AST SERPL W P-5'-P-CCNC: 14 U/L (ref 15–37)
BACTERIA URNS QL MICRO: ABNORMAL /HPF
BASOPHILS # BLD AUTO: 0 K/UL (ref 0–0.06)
BASOPHILS # BLD: 1 % (ref 0–2)
BILIRUB SERPL-MCNC: 0.3 MG/DL (ref 0.2–1)
BILIRUB UR QL: NEGATIVE
BLOOD GROUP ANTIBODIES SERPL: NORMAL
BUN SERPL-MCNC: 15 MG/DL (ref 7–18)
BUN/CREAT SERPL: 17 (ref 12–20)
CALCIUM SERPL-MCNC: 8.4 MG/DL (ref 8.5–10.1)
CHLORIDE SERPL-SCNC: 105 MMOL/L (ref 100–108)
CO2 SERPL-SCNC: 30 MMOL/L (ref 21–32)
COLOR UR: YELLOW
CREAT SERPL-MCNC: 0.86 MG/DL (ref 0.6–1.3)
DIFFERENTIAL METHOD BLD: ABNORMAL
EOSINOPHIL # BLD: 0.1 K/UL (ref 0–0.4)
EOSINOPHIL NFR BLD: 2 % (ref 0–5)
EPITH CASTS URNS QL MICRO: ABNORMAL /LPF (ref 0–5)
ERYTHROCYTE [DISTWIDTH] IN BLOOD BY AUTOMATED COUNT: 17.8 % (ref 11.6–14.5)
GLOBULIN SER CALC-MCNC: 2.9 G/DL (ref 2–4)
GLUCOSE SERPL-MCNC: 95 MG/DL (ref 74–99)
GLUCOSE UR STRIP.AUTO-MCNC: NEGATIVE MG/DL
HCT VFR BLD AUTO: 36.4 % (ref 35–45)
HGB BLD-MCNC: 11.8 G/DL (ref 12–16)
HGB UR QL STRIP: ABNORMAL
KETONES UR QL STRIP.AUTO: NEGATIVE MG/DL
LEUKOCYTE ESTERASE UR QL STRIP.AUTO: NEGATIVE
LYMPHOCYTES # BLD AUTO: 19 % (ref 21–52)
LYMPHOCYTES # BLD: 1.1 K/UL (ref 0.9–3.6)
MCH RBC QN AUTO: 28.4 PG (ref 24–34)
MCHC RBC AUTO-ENTMCNC: 32.4 G/DL (ref 31–37)
MCV RBC AUTO: 87.5 FL (ref 74–97)
MONOCYTES # BLD: 0.4 K/UL (ref 0.05–1.2)
MONOCYTES NFR BLD AUTO: 7 % (ref 3–10)
NEUTS SEG # BLD: 4.3 K/UL (ref 1.8–8)
NEUTS SEG NFR BLD AUTO: 71 % (ref 40–73)
NITRITE UR QL STRIP.AUTO: NEGATIVE
PH UR STRIP: 6 [PH] (ref 5–8)
PLATELET # BLD AUTO: 305 K/UL (ref 135–420)
PMV BLD AUTO: 9.1 FL (ref 9.2–11.8)
POTASSIUM SERPL-SCNC: 3.9 MMOL/L (ref 3.5–5.5)
PROT SERPL-MCNC: 6.5 G/DL (ref 6.4–8.2)
PROT UR STRIP-MCNC: NEGATIVE MG/DL
RBC # BLD AUTO: 4.16 M/UL (ref 4.2–5.3)
RBC #/AREA URNS HPF: ABNORMAL /HPF (ref 0–5)
RPR SER QL: NONREACTIVE
SODIUM SERPL-SCNC: 141 MMOL/L (ref 136–145)
SP GR UR REFRACTOMETRY: 1.03 (ref 1–1.03)
SPECIMEN EXP DATE BLD: NORMAL
UROBILINOGEN UR QL STRIP.AUTO: 0.2 EU/DL (ref 0.2–1)
WBC # BLD AUTO: 6 K/UL (ref 4.6–13.2)
WBC URNS QL MICRO: ABNORMAL /HPF (ref 0–5)

## 2017-07-07 PROCEDURE — 86900 BLOOD TYPING SEROLOGIC ABO: CPT | Performed by: OBSTETRICS & GYNECOLOGY

## 2017-07-07 PROCEDURE — 80053 COMPREHEN METABOLIC PANEL: CPT | Performed by: OBSTETRICS & GYNECOLOGY

## 2017-07-07 PROCEDURE — 86592 SYPHILIS TEST NON-TREP QUAL: CPT | Performed by: OBSTETRICS & GYNECOLOGY

## 2017-07-07 PROCEDURE — 36415 COLL VENOUS BLD VENIPUNCTURE: CPT | Performed by: OBSTETRICS & GYNECOLOGY

## 2017-07-07 PROCEDURE — 81001 URINALYSIS AUTO W/SCOPE: CPT | Performed by: OBSTETRICS & GYNECOLOGY

## 2017-07-07 PROCEDURE — 85025 COMPLETE CBC W/AUTO DIFF WBC: CPT | Performed by: OBSTETRICS & GYNECOLOGY

## 2017-07-11 PROBLEM — N92.0 MENORRHAGIA: Status: ACTIVE | Noted: 2017-03-30

## 2017-07-11 PROBLEM — N84.0 ENDOMETRIAL POLYP: Chronic | Status: ACTIVE | Noted: 2017-07-11

## 2017-07-11 NOTE — INTERVAL H&P NOTE
H&P Update:  Mitchell Dunbar was seen and examined. History and physical has been reviewed. The patient has been examined.  There have been no significant clinical changes since the completion of the originally dated History and Physical.    Signed By: Otis Styles MD     July 11, 2017 4:09 PM

## 2017-07-12 ENCOUNTER — ANESTHESIA EVENT (OUTPATIENT)
Dept: SURGERY | Age: 52
DRG: 743 | End: 2017-07-12
Payer: COMMERCIAL

## 2017-07-12 ENCOUNTER — ANESTHESIA (OUTPATIENT)
Dept: SURGERY | Age: 52
DRG: 743 | End: 2017-07-12
Payer: COMMERCIAL

## 2017-07-12 ENCOUNTER — HOSPITAL ENCOUNTER (INPATIENT)
Age: 52
LOS: 2 days | Discharge: HOME | DRG: 743 | End: 2017-07-14
Attending: OBSTETRICS & GYNECOLOGY | Admitting: OBSTETRICS & GYNECOLOGY
Payer: COMMERCIAL

## 2017-07-12 PROBLEM — D25.9 FIBROID UTERUS: Status: RESOLVED | Noted: 2017-03-31 | Resolved: 2017-07-12

## 2017-07-12 PROBLEM — N84.0 ENDOMETRIAL POLYP: Chronic | Status: RESOLVED | Noted: 2017-07-11 | Resolved: 2017-07-12

## 2017-07-12 PROBLEM — N92.0 MENORRHAGIA: Status: RESOLVED | Noted: 2017-03-30 | Resolved: 2017-07-12

## 2017-07-12 LAB — HCG SERPL QL: NEGATIVE

## 2017-07-12 PROCEDURE — 77030018836 HC SOL IRR NACL ICUM -A: Performed by: OBSTETRICS & GYNECOLOGY

## 2017-07-12 PROCEDURE — 88305 TISSUE EXAM BY PATHOLOGIST: CPT | Performed by: OBSTETRICS & GYNECOLOGY

## 2017-07-12 PROCEDURE — 74011250636 HC RX REV CODE- 250/636

## 2017-07-12 PROCEDURE — 65270000029 HC RM PRIVATE

## 2017-07-12 PROCEDURE — 77030005521 HC CATH URETH FOL38 BARD -B: Performed by: OBSTETRICS & GYNECOLOGY

## 2017-07-12 PROCEDURE — 0UNF0ZZ RELEASE CUL-DE-SAC, OPEN APPROACH: ICD-10-PCS | Performed by: OBSTETRICS & GYNECOLOGY

## 2017-07-12 PROCEDURE — 74011250636 HC RX REV CODE- 250/636: Performed by: OBSTETRICS & GYNECOLOGY

## 2017-07-12 PROCEDURE — 8E0W0CZ ROBOTIC ASSISTED PROCEDURE OF TRUNK REGION, OPEN APPROACH: ICD-10-PCS | Performed by: OBSTETRICS & GYNECOLOGY

## 2017-07-12 PROCEDURE — 77030020782 HC GWN BAIR PAWS FLX 3M -B: Performed by: OBSTETRICS & GYNECOLOGY

## 2017-07-12 PROCEDURE — 0DNE0ZZ RELEASE LARGE INTESTINE, OPEN APPROACH: ICD-10-PCS | Performed by: OBSTETRICS & GYNECOLOGY

## 2017-07-12 PROCEDURE — 0UN10ZZ RELEASE LEFT OVARY, OPEN APPROACH: ICD-10-PCS | Performed by: OBSTETRICS & GYNECOLOGY

## 2017-07-12 PROCEDURE — 0DNV0ZZ RELEASE MESENTERY, OPEN APPROACH: ICD-10-PCS | Performed by: OBSTETRICS & GYNECOLOGY

## 2017-07-12 PROCEDURE — 0UT10ZZ RESECTION OF LEFT OVARY, OPEN APPROACH: ICD-10-PCS | Performed by: OBSTETRICS & GYNECOLOGY

## 2017-07-12 PROCEDURE — 0UT90ZZ RESECTION OF UTERUS, OPEN APPROACH: ICD-10-PCS | Performed by: OBSTETRICS & GYNECOLOGY

## 2017-07-12 PROCEDURE — 77030008462 HC STPLR SKN PROX J&J -A: Performed by: OBSTETRICS & GYNECOLOGY

## 2017-07-12 PROCEDURE — 77030005537 HC CATH URETH BARD -A: Performed by: OBSTETRICS & GYNECOLOGY

## 2017-07-12 PROCEDURE — 84703 CHORIONIC GONADOTROPIN ASSAY: CPT | Performed by: ANESTHESIOLOGY

## 2017-07-12 PROCEDURE — 76010000133 HC OR TIME 3 TO 3.5 HR: Performed by: OBSTETRICS & GYNECOLOGY

## 2017-07-12 PROCEDURE — 36415 COLL VENOUS BLD VENIPUNCTURE: CPT | Performed by: ANESTHESIOLOGY

## 2017-07-12 PROCEDURE — 76060000037 HC ANESTHESIA 3 TO 3.5 HR: Performed by: OBSTETRICS & GYNECOLOGY

## 2017-07-12 PROCEDURE — 74011250636 HC RX REV CODE- 250/636: Performed by: SPECIALIST

## 2017-07-12 PROCEDURE — 74011000250 HC RX REV CODE- 250

## 2017-07-12 PROCEDURE — C1765 ADHESION BARRIER: HCPCS | Performed by: OBSTETRICS & GYNECOLOGY

## 2017-07-12 PROCEDURE — 77030031139 HC SUT VCRL2 J&J -A: Performed by: OBSTETRICS & GYNECOLOGY

## 2017-07-12 PROCEDURE — 77010033678 HC OXYGEN DAILY

## 2017-07-12 PROCEDURE — 0UT70ZZ RESECTION OF BILATERAL FALLOPIAN TUBES, OPEN APPROACH: ICD-10-PCS | Performed by: OBSTETRICS & GYNECOLOGY

## 2017-07-12 PROCEDURE — 77030002888 HC SUT CHRMC J&J -A: Performed by: OBSTETRICS & GYNECOLOGY

## 2017-07-12 PROCEDURE — 77030002933 HC SUT MCRYL J&J -A: Performed by: OBSTETRICS & GYNECOLOGY

## 2017-07-12 PROCEDURE — 0UN60ZZ RELEASE LEFT FALLOPIAN TUBE, OPEN APPROACH: ICD-10-PCS | Performed by: OBSTETRICS & GYNECOLOGY

## 2017-07-12 PROCEDURE — 76210000001 HC OR PH I REC 2.5 TO 3 HR: Performed by: OBSTETRICS & GYNECOLOGY

## 2017-07-12 PROCEDURE — 88307 TISSUE EXAM BY PATHOLOGIST: CPT | Performed by: OBSTETRICS & GYNECOLOGY

## 2017-07-12 RX ORDER — DIPHENHYDRAMINE HYDROCHLORIDE 50 MG/ML
12.5 INJECTION, SOLUTION INTRAMUSCULAR; INTRAVENOUS
Status: DISCONTINUED | OUTPATIENT
Start: 2017-07-12 | End: 2017-07-14 | Stop reason: HOSPADM

## 2017-07-12 RX ORDER — SODIUM CHLORIDE 0.9 % (FLUSH) 0.9 %
5-10 SYRINGE (ML) INJECTION EVERY 8 HOURS
Status: DISCONTINUED | OUTPATIENT
Start: 2017-07-12 | End: 2017-07-14 | Stop reason: SDUPTHER

## 2017-07-12 RX ORDER — SODIUM CHLORIDE 0.9 % (FLUSH) 0.9 %
5-10 SYRINGE (ML) INJECTION EVERY 8 HOURS
Status: DISCONTINUED | OUTPATIENT
Start: 2017-07-12 | End: 2017-07-12 | Stop reason: HOSPADM

## 2017-07-12 RX ORDER — SODIUM CHLORIDE 0.9 % (FLUSH) 0.9 %
5-10 SYRINGE (ML) INJECTION AS NEEDED
Status: DISCONTINUED | OUTPATIENT
Start: 2017-07-12 | End: 2017-07-14 | Stop reason: SDUPTHER

## 2017-07-12 RX ORDER — SODIUM CHLORIDE 0.9 % (FLUSH) 0.9 %
5-10 SYRINGE (ML) INJECTION AS NEEDED
Status: DISCONTINUED | OUTPATIENT
Start: 2017-07-12 | End: 2017-07-14 | Stop reason: HOSPADM

## 2017-07-12 RX ORDER — ONDANSETRON 2 MG/ML
INJECTION INTRAMUSCULAR; INTRAVENOUS AS NEEDED
Status: DISCONTINUED | OUTPATIENT
Start: 2017-07-12 | End: 2017-07-12 | Stop reason: HOSPADM

## 2017-07-12 RX ORDER — HYDROMORPHONE HYDROCHLORIDE 2 MG/ML
INJECTION, SOLUTION INTRAMUSCULAR; INTRAVENOUS; SUBCUTANEOUS AS NEEDED
Status: DISCONTINUED | OUTPATIENT
Start: 2017-07-12 | End: 2017-07-12 | Stop reason: HOSPADM

## 2017-07-12 RX ORDER — OXYCODONE AND ACETAMINOPHEN 5; 325 MG/1; MG/1
1 TABLET ORAL AS NEEDED
Status: DISCONTINUED | OUTPATIENT
Start: 2017-07-12 | End: 2017-07-14 | Stop reason: HOSPADM

## 2017-07-12 RX ORDER — SODIUM CHLORIDE 9 MG/ML
125 INJECTION, SOLUTION INTRAVENOUS CONTINUOUS
Status: DISPENSED | OUTPATIENT
Start: 2017-07-12 | End: 2017-07-13

## 2017-07-12 RX ORDER — HYDROMORPHONE HYDROCHLORIDE 2 MG/ML
0.5 INJECTION, SOLUTION INTRAMUSCULAR; INTRAVENOUS; SUBCUTANEOUS AS NEEDED
Status: DISCONTINUED | OUTPATIENT
Start: 2017-07-12 | End: 2017-07-12 | Stop reason: HOSPADM

## 2017-07-12 RX ORDER — GLYCOPYRROLATE 0.2 MG/ML
INJECTION INTRAMUSCULAR; INTRAVENOUS AS NEEDED
Status: DISCONTINUED | OUTPATIENT
Start: 2017-07-12 | End: 2017-07-12 | Stop reason: HOSPADM

## 2017-07-12 RX ORDER — NALOXONE HYDROCHLORIDE 0.4 MG/ML
0.4 INJECTION, SOLUTION INTRAMUSCULAR; INTRAVENOUS; SUBCUTANEOUS AS NEEDED
Status: DISCONTINUED | OUTPATIENT
Start: 2017-07-12 | End: 2017-07-14 | Stop reason: HOSPADM

## 2017-07-12 RX ORDER — KETOROLAC TROMETHAMINE 30 MG/ML
30 INJECTION, SOLUTION INTRAMUSCULAR; INTRAVENOUS EVERY 6 HOURS
Status: DISCONTINUED | OUTPATIENT
Start: 2017-07-12 | End: 2017-07-12

## 2017-07-12 RX ORDER — ROCURONIUM BROMIDE 10 MG/ML
INJECTION, SOLUTION INTRAVENOUS AS NEEDED
Status: DISCONTINUED | OUTPATIENT
Start: 2017-07-12 | End: 2017-07-12 | Stop reason: HOSPADM

## 2017-07-12 RX ORDER — ZOLPIDEM TARTRATE 5 MG/1
5 TABLET ORAL
Status: DISCONTINUED | OUTPATIENT
Start: 2017-07-12 | End: 2017-07-14 | Stop reason: HOSPADM

## 2017-07-12 RX ORDER — CEFAZOLIN SODIUM 2 G/50ML
2 SOLUTION INTRAVENOUS EVERY 8 HOURS
Status: COMPLETED | OUTPATIENT
Start: 2017-07-12 | End: 2017-07-13

## 2017-07-12 RX ORDER — ACETAMINOPHEN 10 MG/ML
1000 INJECTION, SOLUTION INTRAVENOUS AS NEEDED
Status: DISCONTINUED | OUTPATIENT
Start: 2017-07-12 | End: 2017-07-12 | Stop reason: HOSPADM

## 2017-07-12 RX ORDER — OXYCODONE AND ACETAMINOPHEN 5; 325 MG/1; MG/1
1 TABLET ORAL
Status: DISCONTINUED | OUTPATIENT
Start: 2017-07-12 | End: 2017-07-14

## 2017-07-12 RX ORDER — NEOSTIGMINE METHYLSULFATE 5 MG/5 ML
SYRINGE (ML) INTRAVENOUS AS NEEDED
Status: DISCONTINUED | OUTPATIENT
Start: 2017-07-12 | End: 2017-07-12 | Stop reason: HOSPADM

## 2017-07-12 RX ORDER — SODIUM CHLORIDE 0.9 % (FLUSH) 0.9 %
5-10 SYRINGE (ML) INJECTION AS NEEDED
Status: DISCONTINUED | OUTPATIENT
Start: 2017-07-12 | End: 2017-07-12 | Stop reason: HOSPADM

## 2017-07-12 RX ORDER — SODIUM CHLORIDE, SODIUM LACTATE, POTASSIUM CHLORIDE, CALCIUM CHLORIDE 600; 310; 30; 20 MG/100ML; MG/100ML; MG/100ML; MG/100ML
50 INJECTION, SOLUTION INTRAVENOUS CONTINUOUS
Status: DISCONTINUED | OUTPATIENT
Start: 2017-07-12 | End: 2017-07-12 | Stop reason: HOSPADM

## 2017-07-12 RX ORDER — SODIUM CHLORIDE, SODIUM LACTATE, POTASSIUM CHLORIDE, CALCIUM CHLORIDE 600; 310; 30; 20 MG/100ML; MG/100ML; MG/100ML; MG/100ML
INJECTION, SOLUTION INTRAVENOUS
Status: DISCONTINUED | OUTPATIENT
Start: 2017-07-12 | End: 2017-07-12

## 2017-07-12 RX ORDER — HYDROMORPHONE HYDROCHLORIDE 1 MG/ML
0.5 INJECTION, SOLUTION INTRAMUSCULAR; INTRAVENOUS; SUBCUTANEOUS AS NEEDED
Status: DISCONTINUED | OUTPATIENT
Start: 2017-07-12 | End: 2017-07-12 | Stop reason: HOSPADM

## 2017-07-12 RX ORDER — LIDOCAINE HYDROCHLORIDE 20 MG/ML
INJECTION, SOLUTION EPIDURAL; INFILTRATION; INTRACAUDAL; PERINEURAL AS NEEDED
Status: DISCONTINUED | OUTPATIENT
Start: 2017-07-12 | End: 2017-07-12 | Stop reason: HOSPADM

## 2017-07-12 RX ORDER — MIDAZOLAM HYDROCHLORIDE 1 MG/ML
INJECTION, SOLUTION INTRAMUSCULAR; INTRAVENOUS AS NEEDED
Status: DISCONTINUED | OUTPATIENT
Start: 2017-07-12 | End: 2017-07-12 | Stop reason: HOSPADM

## 2017-07-12 RX ORDER — KETOROLAC TROMETHAMINE 30 MG/ML
30 INJECTION, SOLUTION INTRAMUSCULAR; INTRAVENOUS
Status: COMPLETED | OUTPATIENT
Start: 2017-07-12 | End: 2017-07-14

## 2017-07-12 RX ORDER — SODIUM CHLORIDE, SODIUM LACTATE, POTASSIUM CHLORIDE, CALCIUM CHLORIDE 600; 310; 30; 20 MG/100ML; MG/100ML; MG/100ML; MG/100ML
125 INJECTION, SOLUTION INTRAVENOUS CONTINUOUS
Status: DISCONTINUED | OUTPATIENT
Start: 2017-07-12 | End: 2017-07-14 | Stop reason: HOSPADM

## 2017-07-12 RX ORDER — FENTANYL CITRATE 50 UG/ML
INJECTION, SOLUTION INTRAMUSCULAR; INTRAVENOUS AS NEEDED
Status: DISCONTINUED | OUTPATIENT
Start: 2017-07-12 | End: 2017-07-12 | Stop reason: HOSPADM

## 2017-07-12 RX ORDER — NALOXONE HYDROCHLORIDE 0.4 MG/ML
0.1 INJECTION, SOLUTION INTRAMUSCULAR; INTRAVENOUS; SUBCUTANEOUS
Status: DISCONTINUED | OUTPATIENT
Start: 2017-07-12 | End: 2017-07-14 | Stop reason: HOSPADM

## 2017-07-12 RX ORDER — ONDANSETRON 2 MG/ML
4 INJECTION INTRAMUSCULAR; INTRAVENOUS ONCE
Status: ACTIVE | OUTPATIENT
Start: 2017-07-12 | End: 2017-07-12

## 2017-07-12 RX ORDER — EPHEDRINE SULFATE/0.9% NACL/PF 25 MG/5 ML
SYRINGE (ML) INTRAVENOUS AS NEEDED
Status: DISCONTINUED | OUTPATIENT
Start: 2017-07-12 | End: 2017-07-12 | Stop reason: HOSPADM

## 2017-07-12 RX ORDER — SODIUM CHLORIDE 0.9 % (FLUSH) 0.9 %
5-10 SYRINGE (ML) INJECTION EVERY 8 HOURS
Status: DISCONTINUED | OUTPATIENT
Start: 2017-07-12 | End: 2017-07-14 | Stop reason: HOSPADM

## 2017-07-12 RX ORDER — FENTANYL CITRATE 50 UG/ML
25 INJECTION, SOLUTION INTRAMUSCULAR; INTRAVENOUS
Status: DISPENSED | OUTPATIENT
Start: 2017-07-12 | End: 2017-07-12

## 2017-07-12 RX ORDER — DEXAMETHASONE SODIUM PHOSPHATE 4 MG/ML
INJECTION, SOLUTION INTRA-ARTICULAR; INTRALESIONAL; INTRAMUSCULAR; INTRAVENOUS; SOFT TISSUE AS NEEDED
Status: DISCONTINUED | OUTPATIENT
Start: 2017-07-12 | End: 2017-07-12 | Stop reason: HOSPADM

## 2017-07-12 RX ORDER — CEFAZOLIN SODIUM 2 G/50ML
2 SOLUTION INTRAVENOUS ONCE
Status: COMPLETED | OUTPATIENT
Start: 2017-07-12 | End: 2017-07-12

## 2017-07-12 RX ORDER — HYDROMORPHONE HYDROCHLORIDE 2 MG/ML
0.5 INJECTION, SOLUTION INTRAMUSCULAR; INTRAVENOUS; SUBCUTANEOUS
Status: COMPLETED | OUTPATIENT
Start: 2017-07-12 | End: 2017-07-12

## 2017-07-12 RX ORDER — DIPHENHYDRAMINE HCL 25 MG
25 CAPSULE ORAL
Status: DISCONTINUED | OUTPATIENT
Start: 2017-07-12 | End: 2017-07-14 | Stop reason: HOSPADM

## 2017-07-12 RX ORDER — PROPOFOL 10 MG/ML
INJECTION, EMULSION INTRAVENOUS AS NEEDED
Status: DISCONTINUED | OUTPATIENT
Start: 2017-07-12 | End: 2017-07-12 | Stop reason: HOSPADM

## 2017-07-12 RX ADMIN — ACETAMINOPHEN 1000 MG: 10 INJECTION, SOLUTION INTRAVENOUS at 12:32

## 2017-07-12 RX ADMIN — SODIUM CHLORIDE 125 ML/HR: 900 INJECTION, SOLUTION INTRAVENOUS at 17:17

## 2017-07-12 RX ADMIN — HYDROMORPHONE HYDROCHLORIDE 0.5 MG: 2 INJECTION INTRAMUSCULAR; INTRAVENOUS; SUBCUTANEOUS at 13:06

## 2017-07-12 RX ADMIN — HYDROMORPHONE HYDROCHLORIDE 0.5 MG: 2 INJECTION INTRAMUSCULAR; INTRAVENOUS; SUBCUTANEOUS at 13:37

## 2017-07-12 RX ADMIN — CEFAZOLIN SODIUM 2 G: 2 SOLUTION INTRAVENOUS at 18:00

## 2017-07-12 RX ADMIN — FENTANYL CITRATE 75 MCG: 50 INJECTION, SOLUTION INTRAMUSCULAR; INTRAVENOUS at 08:02

## 2017-07-12 RX ADMIN — SODIUM CHLORIDE, SODIUM LACTATE, POTASSIUM CHLORIDE, AND CALCIUM CHLORIDE: 600; 310; 30; 20 INJECTION, SOLUTION INTRAVENOUS at 08:35

## 2017-07-12 RX ADMIN — HYDROMORPHONE HYDROCHLORIDE 0.5 MG: 2 INJECTION INTRAMUSCULAR; INTRAVENOUS; SUBCUTANEOUS at 13:18

## 2017-07-12 RX ADMIN — GLYCOPYRROLATE 0.4 MG: 0.2 INJECTION INTRAMUSCULAR; INTRAVENOUS at 10:43

## 2017-07-12 RX ADMIN — Medication 5 MG: at 08:27

## 2017-07-12 RX ADMIN — FENTANYL CITRATE 25 MCG: 50 INJECTION, SOLUTION INTRAMUSCULAR; INTRAVENOUS at 12:15

## 2017-07-12 RX ADMIN — HYDROMORPHONE HYDROCHLORIDE 0.5 MG: 2 INJECTION INTRAMUSCULAR; INTRAVENOUS; SUBCUTANEOUS at 11:59

## 2017-07-12 RX ADMIN — HYDROMORPHONE HYDROCHLORIDE 0.5 MG: 2 INJECTION INTRAMUSCULAR; INTRAVENOUS; SUBCUTANEOUS at 11:40

## 2017-07-12 RX ADMIN — FENTANYL CITRATE 25 MCG: 50 INJECTION, SOLUTION INTRAMUSCULAR; INTRAVENOUS at 08:22

## 2017-07-12 RX ADMIN — LIDOCAINE HYDROCHLORIDE 80 MG: 20 INJECTION, SOLUTION EPIDURAL; INFILTRATION; INTRACAUDAL; PERINEURAL at 08:03

## 2017-07-12 RX ADMIN — PROPOFOL 200 MG: 10 INJECTION, EMULSION INTRAVENOUS at 08:03

## 2017-07-12 RX ADMIN — DEXAMETHASONE SODIUM PHOSPHATE 4 MG: 4 INJECTION, SOLUTION INTRA-ARTICULAR; INTRALESIONAL; INTRAMUSCULAR; INTRAVENOUS; SOFT TISSUE at 08:24

## 2017-07-12 RX ADMIN — HYDROMORPHONE HYDROCHLORIDE 0.5 MG: 2 INJECTION, SOLUTION INTRAMUSCULAR; INTRAVENOUS; SUBCUTANEOUS at 11:11

## 2017-07-12 RX ADMIN — CEFAZOLIN SODIUM 2 G: 2 SOLUTION INTRAVENOUS at 08:04

## 2017-07-12 RX ADMIN — ROCURONIUM BROMIDE 10 MG: 10 INJECTION, SOLUTION INTRAVENOUS at 09:02

## 2017-07-12 RX ADMIN — HYDROMORPHONE HYDROCHLORIDE: 10 INJECTION, SOLUTION INTRAMUSCULAR; INTRAVENOUS; SUBCUTANEOUS at 12:18

## 2017-07-12 RX ADMIN — ONDANSETRON 4 MG: 2 INJECTION INTRAMUSCULAR; INTRAVENOUS at 10:13

## 2017-07-12 RX ADMIN — HYDROMORPHONE HYDROCHLORIDE 0.5 MG: 2 INJECTION INTRAMUSCULAR; INTRAVENOUS; SUBCUTANEOUS at 13:28

## 2017-07-12 RX ADMIN — ROCURONIUM BROMIDE 30 MG: 10 INJECTION, SOLUTION INTRAVENOUS at 08:04

## 2017-07-12 RX ADMIN — HYDROMORPHONE HYDROCHLORIDE 0.5 MG: 2 INJECTION, SOLUTION INTRAMUSCULAR; INTRAVENOUS; SUBCUTANEOUS at 08:31

## 2017-07-12 RX ADMIN — HYDROMORPHONE HYDROCHLORIDE 0.5 MG: 2 INJECTION INTRAMUSCULAR; INTRAVENOUS; SUBCUTANEOUS at 11:49

## 2017-07-12 RX ADMIN — HYDROMORPHONE HYDROCHLORIDE: 10 INJECTION, SOLUTION INTRAMUSCULAR; INTRAVENOUS; SUBCUTANEOUS at 14:52

## 2017-07-12 RX ADMIN — KETOROLAC TROMETHAMINE 30 MG: 30 INJECTION, SOLUTION INTRAMUSCULAR; INTRAVENOUS at 10:17

## 2017-07-12 RX ADMIN — MIDAZOLAM HYDROCHLORIDE 2 MG: 1 INJECTION, SOLUTION INTRAMUSCULAR; INTRAVENOUS at 07:56

## 2017-07-12 RX ADMIN — ROCURONIUM BROMIDE 10 MG: 10 INJECTION, SOLUTION INTRAVENOUS at 09:41

## 2017-07-12 RX ADMIN — KETOROLAC TROMETHAMINE 30 MG: 30 INJECTION, SOLUTION INTRAMUSCULAR at 17:59

## 2017-07-12 RX ADMIN — SODIUM CHLORIDE, SODIUM LACTATE, POTASSIUM CHLORIDE, AND CALCIUM CHLORIDE 125 ML/HR: 600; 310; 30; 20 INJECTION, SOLUTION INTRAVENOUS at 11:34

## 2017-07-12 RX ADMIN — ROCURONIUM BROMIDE 10 MG: 10 INJECTION, SOLUTION INTRAVENOUS at 08:26

## 2017-07-12 RX ADMIN — HYDROMORPHONE HYDROCHLORIDE 0.5 MG: 1 INJECTION, SOLUTION INTRAMUSCULAR; INTRAVENOUS; SUBCUTANEOUS at 12:48

## 2017-07-12 RX ADMIN — HYDROMORPHONE HYDROCHLORIDE 0.5 MG: 2 INJECTION INTRAMUSCULAR; INTRAVENOUS; SUBCUTANEOUS at 11:31

## 2017-07-12 RX ADMIN — HYDROMORPHONE HYDROCHLORIDE 0.5 MG: 1 INJECTION, SOLUTION INTRAMUSCULAR; INTRAVENOUS; SUBCUTANEOUS at 12:35

## 2017-07-12 RX ADMIN — SODIUM CHLORIDE, SODIUM LACTATE, POTASSIUM CHLORIDE, AND CALCIUM CHLORIDE: 600; 310; 30; 20 INJECTION, SOLUTION INTRAVENOUS at 10:05

## 2017-07-12 RX ADMIN — HYDROMORPHONE HYDROCHLORIDE 0.5 MG: 2 INJECTION, SOLUTION INTRAMUSCULAR; INTRAVENOUS; SUBCUTANEOUS at 10:55

## 2017-07-12 RX ADMIN — ROCURONIUM BROMIDE 5 MG: 10 INJECTION, SOLUTION INTRAVENOUS at 10:15

## 2017-07-12 RX ADMIN — Medication 2 MG: at 10:43

## 2017-07-12 RX ADMIN — HYDROMORPHONE HYDROCHLORIDE 0.5 MG: 2 INJECTION, SOLUTION INTRAMUSCULAR; INTRAVENOUS; SUBCUTANEOUS at 08:41

## 2017-07-12 RX ADMIN — SODIUM CHLORIDE, SODIUM LACTATE, POTASSIUM CHLORIDE, AND CALCIUM CHLORIDE 125 ML/HR: 600; 310; 30; 20 INJECTION, SOLUTION INTRAVENOUS at 06:56

## 2017-07-12 RX ADMIN — Medication 5 MG: at 08:22

## 2017-07-12 NOTE — ROUTINE PROCESS
Bedside report received from David Brown RN. Assumed care of patient. Pt found resting in bed. Call light with in reach.

## 2017-07-12 NOTE — PROGRESS NOTES
conducted a pre-surgery visit with Oralia Vargas, who is a 47 y.o.,female. The  provided the following Interventions:  Initiated a relationship of care and support. Offered prayer and assurance of continued prayers on patient's behalf. Plan:  Chaplains will continue to follow and will provide pastoral care on an as needed/requested basis.  recommends bedside caregivers page  on duty if patient shows signs of acute spiritual or emotional distress.     [address] [facility],[address], [address] office  [phone] pager

## 2017-07-12 NOTE — OP NOTES
Operative Note      Patient: Rafael Patten               Sex: female          DOA: 7/12/2017         YOB: 1965      Age:  47 y.o.        LOS:  LOS: 0 days     Preoperative Diagnosis: DYSFUNCTIONAL UTERINE BLEEDING,PELVIC PAIN,FIBROID UTERUS    Postoperative Diagnosis:  DYSFUNCTIONAL UTERINE BLEEDING,PELVIC PAIN,FIBROID UTERUS, STAGE 3 ENDOMETRIOSIS    Surgeon: Surgeon(s) and Role:     * Alexei Dinero MD - Primary    Anesthesia:  General    Estimated Blood Loss:  200cc    Estimated Blood Replacement:  o    Estimated Fluid Absorption: 0    Procedure:  Procedure(s):   ABDOMINAL SUPRACERVICAL HYSTERECTOMY, LEFT SALPINGO OOPHORECTOMY, RIGHT SALPINGECTOMY     Procedure in Detail: See dictation ticket #[id number]    Findings:    Uterus:14cm endo   Tubes:    Right: hydro rt s       Left s e o  culdisac permesenteric adhesion freed lt s e o done   Ovaries:      Right: nml    Left: saafor tube   Appi:  Ant nml   GB Head: not palp   Liver: no irreg felt   ABD: clean pop   Pelvis: all but lt cvx cleared abd sc hyst done                 Photo Documentation:  See Chart Review, Media Tab this date 7/12/2017 for photo documentation na. Implants: * No implants in log *  1. Specimens:   ID Type Source Tests Collected by Time Destination   1 : Left Fallopian tube and Ovary Preservative Fallopian Tube  Alexei Dinero MD 7/12/2017 0401 Pathology   2 : Right fallopian tube Preservative Fallopian Tube  Alexei Dinero MD 7/12/2017 2260 Pathology   3 : Uterus Fresh Uterus  Alexei Dinero MD 7/12/2017 2207 Pathology        Drains: dunlap           Complications:  o           Counts: Sponge and needle counts were correct times two.     Prophylaxis:   ancef    Patient Status Op end: stable

## 2017-07-12 NOTE — INTERVAL H&P NOTE
H&P Update:  Anderson Malhotra was seen and examined. History and physical has been reviewed. The patient has been examined.  There have been no significant clinical changes since the completion of the originally dated History and Physical.    Signed By: Rosario Humphries MD     July 12, 2017 6:23 AM

## 2017-07-12 NOTE — PROGRESS NOTES
65 Dr. Brayan Abdalla returned call, orders given to restart PCA, diet to remaine NPO with chips and sips, pt to stay in bed at this time related to concerns with safety and narcotics. 1600 PAIN medication given. Potential medication side effects explained to patient, patient verbalizes understanding, opportunities for questions provided. Patient stable, no apparent distress at this time, bed in locked position, call bell within reach.

## 2017-07-12 NOTE — PROGRESS NOTES
1435  Received patient to room 307 in stable condition. Dressing to mid lower abdomen clean,dry and intact. No c/o pain,discomfort ,N/V. Call light within reach. Bedside and Verbal shift change report given to Hector Eugene (oncoming nurse) by Wanda Castle (offgoing nurse). Report included the following information SBAR and Kardex.

## 2017-07-12 NOTE — PERIOP NOTES
Patient continues to c/o \"burning\" pain to incision site; pain 8/10. Chayito Sandoval MD aware and to bedside to assess patient. Additional doses of dilaudid ordered for pain control.

## 2017-07-12 NOTE — ANESTHESIA PREPROCEDURE EVALUATION
Anesthetic History     PONV (just after CS)          Review of Systems / Medical History  Patient summary reviewed and pertinent labs reviewed    Pulmonary  Within defined limits                 Neuro/Psych   Within defined limits           Cardiovascular  Within defined limits                Exercise tolerance: >4 METS     GI/Hepatic/Renal  Within defined limits              Endo/Other        Anemia     Other Findings              Physical Exam    Airway  Mallampati: II  TM Distance: 4 - 6 cm  Neck ROM: normal range of motion   Mouth opening: Normal     Cardiovascular               Dental  No notable dental hx       Pulmonary                 Abdominal         Other Findings            Anesthetic Plan    ASA: 2  Anesthesia type: general          Induction: Intravenous  Anesthetic plan and risks discussed with: Patient and Spouse

## 2017-07-12 NOTE — PROGRESS NOTES
Pt admitted for an elective surgical procedure. Pt is independent. No plan of care needs identified. Anticipate pt will be medically stable for discharge within the next 24-48 hours. CM available to assist as needed. Discharge Reassessment Plan:  Low Risk    RRAT Score:  1 - 12     Low Risk Care Transition Interventions:  1. Discharge transition plan:  Physician follow up  2. Involved patient/caregiver in assessment, planning, education and implement of intervention. 3. CM daily patient care huddles/interdisciplinary rounds were completed. 4. PCP/Specialist appointment within 5 days made prior to discharge. Date/Time  5. Facilitated transportation and logistics for follow-up appointments. 6. Handoff to [address] [place] [address] Nurse Navigator or PCP practice. Care Management Interventions  PCP Verified by CM: Yes  Mode of Transport at Discharge:  Other (see comment) (Spouse)  Transition of Care Consult (CM Consult): Discharge Planning  Health Maintenance Reviewed: Yes  Current Support Network: Lives with Spouse  Confirm Follow Up Transport: Family  Discharge Location  Discharge Placement: Home

## 2017-07-12 NOTE — PERIOP NOTES
TRANSFER - OUT REPORT:    Verbal report given to Jarocho Styles on Crockett Arrant  being transferred to [address] [place] [address] for routine post - op       Report consisted of patients Situation, Background, Assessment and   Recommendations(SBAR). Information from the following report(s) SBAR, OR Summary, Intake/Output and MAR was reviewed with the receiving nurse. Lines:   Peripheral IV 07/12/17 Left Hand (Active)   Site Assessment Clean, dry, & intact 7/12/2017 12:11 PM   Phlebitis Assessment 0 7/12/2017 12:11 PM   Infiltration Assessment 0 7/12/2017 12:11 PM   Dressing Status Clean, dry, & intact 7/12/2017 12:11 PM   Dressing Type Transparent;Tape 7/12/2017 12:11 PM   Hub Color/Line Status Green; Infusing;Patent 7/12/2017 12:11 PM   Alcohol Cap Used No 7/12/2017  6:49 AM      Visit Vitals    /76    Pulse 95    Temp 97.1 °F (36.2 °C)    Resp 9    Ht 5' 3\" (1.6 m)    Wt 78.2 kg (172 lb 6 oz)    LMP 07/03/2017    SpO2 100%    BMI 30.53 kg/m2       Opportunity for questions and clarification was provided.       Patient transported with:   O2 @ 2 liters  Registered Nurse Blank Taylor

## 2017-07-12 NOTE — IP AVS SNAPSHOT
[address] [address] 
[phone] Patient: Lia Caraballo MRN: [id number] :1965 You are allergic to the following No active allergies Recent Documentation Height Weight BMI OB Status Smoking Status 1.6 m 78.2 kg 30.53 kg/m2 Having regular periods Former Smoker Emergency Contacts Name Discharge Info Relation Home Work Mobile Tu Hatfield DISCHARGE CAREGIVER [3] Spouse [3]   [phone] About your hospitalization You were admitted on:  2017 You last received care in the:  [address] [place] [address] You were discharged on:  2017 Unit phone number:  [phone] Why you were hospitalized Your primary diagnosis was:  Not on File Your diagnoses also included:  Endometriosis Of Pelvis, Endometrial Polyp, Fibroid Uterus Providers Seen During Your Hospitalizations Provider Role Specialty Primary office phone Ten Monique MD Attending Provider Obstetrics & Gynecology [phone] Your Primary Care Physician (PCP) Primary Care Physician Office Phone Office Fax 1 [facility], [address] [phone] Follow-up Information Follow up With Details Comments Contact Info Joana Vázquez MD   [address] [place] [address] [phone] Ten Monique MD On 2017 Follow-up appointment @ 1:15 p.m. [facility] [address] [place] [address] 
[phone] Current Discharge Medication List  
  
CONTINUE these medications which have NOT CHANGED Dose & Instructions Dispensing Information Comments Morning Noon Evening Bedtime B COMPLEX 1 tablet Generic drug:  b complex vitamins Your last dose was: Your next dose is:    
   
   
 Dose:  1 Tab Take 1 Tab by mouth daily. Refills:  0 Calcium-Cholecalciferol (D3) 600 mg(1,500mg) -400 unit Cap Your last dose was: Your next dose is: Take  by mouth two (2) days a week. Refills:  0 Iron 325 mg (65 mg iron) tablet Generic drug:  ferrous sulfate Your last dose was: Your next dose is: Take  by mouth Daily (before breakfast). Indications: IRON DEFICIENCY ANEMIA Refills:  0  
     
   
   
   
  
 VITAMIN C 250 mg tablet Generic drug:  ascorbic acid (vitamin C) Your last dose was: Your next dose is:    
   
   
 Dose:  150 mg Take 150 mg by mouth daily. Refills:  0 Discharge Instructions Abdominal Hysterectomy: What to Expect at [facility] Your Recovery You can expect to feel better and stronger each day, although you may need pain medicine for a week or two. You may get tired easily or have less energy than usual. This may last for several weeks after surgery. You will probably notice that your belly is swollen and puffy. This is common. The swelling will take several weeks to go down. It may take about 4 to 6 weeks to fully recover. It is important to avoid lifting while you are recovering so that you can heal. 
This care sheet gives you a general idea about how long it will take for you to recover. But each person recovers at a different pace. Follow the steps below to get better as quickly as possible. How can you care for yourself at home? Activity · Rest when you feel tired. Getting enough sleep will help you recover. · Try to walk each day. Start by walking a little more than you did the day before. Bit by bit, increase the amount you walk. Walking boosts blood flow and helps prevent pneumonia and constipation. · Avoid lifting anything that would make you strain.  This may include a child, heavy grocery bags and milk containers, a heavy briefcase or backpack, cat litter or dog food bags, or a vacuum . · Avoid strenuous activities, such as biking, jogging, weight lifting, or aerobic exercise, until your doctor says it is okay. · You may shower. Pat the cut (incision) dry. Do not take a bath for the first 2 weeks, or until your doctor tells you it is okay. · Ask your doctor when you can drive again. · You will probably need to take 2 to 4 weeks off from work. It depends on the type of work you do and how you feel. · Your doctor will tell you when you can have sex again. Diet · You can eat your normal diet. If your stomach is upset, try bland, low-fat foods like plain rice, broiled chicken, toast, and yogurt. · Drink plenty of fluids (unless your doctor tells you not to). · You may notice that your bowel movements are not regular right after your surgery. This is common. Try to avoid constipation and straining with bowel movements. You may want to take a fiber supplement every day. If you have not had a bowel movement after a couple of days, ask your doctor about taking a mild laxative. Medicines · Your doctor will tell you if and when you can restart your medicines. He or she will also give you instructions about taking any new medicines. · If you take blood thinners, such as warfarin (Coumadin), clopidogrel (Plavix), or aspirin, be sure to talk to your doctor. He or she will tell you if and when to start taking those medicines again. Make sure that you understand exactly what your doctor wants you to do. · Be safe with medicines. Take pain medicines exactly as directed. ¨ If the doctor gave you a prescription medicine for pain, take it as prescribed. ¨ If you are not taking a prescription pain medicine, ask your doctor if you can take an over-the-counter medicine. · If your doctor prescribed antibiotics, take them as directed. Do not stop taking them just because you feel better. You need to take the full course of antibiotics. · If you think your pain medicine is making you sick to your stomach: 
¨ Take your medicine after meals (unless your doctor has told you not to). ¨ Ask your doctor for a different pain medicine. Incision care · If you have strips of tape on the cut (incision) the doctor made, leave the tape on for a week or until it falls off. Or follow your doctor's instructions for removing the tape. · Wash the area daily with warm, soapy water, and pat it dry. Don't use hydrogen peroxide or alcohol, which can slow healing. You may cover the area with a gauze bandage if it weeps or rubs against clothing. Change the bandage every day. · Keep the area clean and dry. Other instructions · You may have some light vaginal bleeding. Wear sanitary pads if needed. Do not douche or use tampons. Follow-up care is a key part of your treatment and safety. Be sure to make and go to all appointments, and call your doctor if you are having problems. It's also a good idea to know your test results and keep a list of the medicines you take. When should you call for help? Call 911 anytime you think you may need emergency care. For example, call if: 
· You passed out (lost consciousness). · You have sudden chest pain and shortness of breath, or you cough up blood. · You have severe pain in your belly. Call your doctor now or seek immediate medical care if: 
· You have bright red vaginal bleeding that soaks one or more pads in an hour, or you have large clots. · You have foul-smelling discharge from your vagina. · You are sick to your stomach or cannot keep fluids down. · You have signs of infection, such as: 
¨ Increased pain, swelling, warmth, or redness. ¨ Red streaks leading from the incision. ¨ Pus draining from the incision. ¨ A fever. · You have pain that does not get better after you take pain medicine. · You have loose stitches, or your incision comes open. · You have signs of a blood clot, such as: ¨ Pain in your calf, back of knee, thigh, or groin. ¨ Redness and swelling in your leg or groin. · You have trouble passing urine or stool, especially if you have pain or swelling in your lower belly. · You have hot flashes, sweating, flushing, or a fast or pounding heartbeat. Watch closely for changes in your health, and be sure to contact your doctor if: 
· You do not have a bowel movement after taking a laxative. Where can you learn more? Go to http://christopher-aida.info/. Enter M280 in the search box to learn more about \"Abdominal Hysterectomy: What to Expect at Home. \" Current as of: October 13, 2016 Content Version: 11.3 © 2606-1370 [employer]. Care instructions adapted under license by [employer] (which disclaims liability or warranty for this information). If you have questions about a medical condition or this instruction, always ask your healthcare professional. [place] [address] any warranty or liability for your use of this information. Discharge Orders None Hobby Announcement We are excited to announce that we are making your provider's discharge notes available to you in Hobby. You will see these notes when they are completed and signed by the physician that discharged you from your recent hospital stay. If you have any questions or concerns about any information you see in Hobby, please call the Health Information Department where you were seen or reach out to your Primary Care Provider for more information about your plan of care. Introducing [facility]! Lea Archuleta introduces Hobby patient portal. Now you can access parts of your medical record, email your doctor's office, and request medication refills online. 1. In your internet browser, go to [email or web address] 2. Click on the First Time User? Click Here link in the Sign In box.  You will see the New Member Sign Up page. 3. Enter your AMX Access Code exactly as it appears below. You will not need to use this code after youve completed the sign-up process. If you do not sign up before the expiration date, you must request a new code. · AMX Access Code: [email or web address] Expires: 9/17/2017  4:42 PM 
 
4. Enter the last four digits of your Social Security Number (xxxx) and Date of Birth (mm/dd/yyyy) as indicated and click Submit. You will be taken to the next sign-up page. 5. Create a AMX ID. This will be your AMX login ID and cannot be changed, so think of one that is secure and easy to remember. 6. Create a AMX password. You can change your password at any time. 7. Enter your Password Reset Question and Answer. This can be used at a later time if you forget your password. 8. Enter your e-mail address. You will receive e-mail notification when new information is available in [address] 9. Click Sign Up. You can now view and download portions of your medical record. 10. Click the Download Summary menu link to download a portable copy of your medical information. If you have questions, please visit the Frequently Asked Questions section of the AMX website. Remember, AMX is NOT to be used for urgent needs. For medical emergencies, dial 911. Now available from your iPhone and Android! General Information Please provide this summary of care documentation to your next provider. Patient Signature:  ____________________________________________________________ Date:  ____________________________________________________________  
  
Rosalia Maldonado Provider Signature:  ____________________________________________________________ Date:  ____________________________________________________________

## 2017-07-12 NOTE — ANESTHESIA POSTPROCEDURE EVALUATION
Post-Anesthesia Evaluation and Assessment    Cardiovascular Function/Vital Signs  Visit Vitals    /73 (BP 1 Location: Right arm, BP Patient Position: At rest)    Pulse 90    Temp 36.2 °C (97.1 °F)    Resp 11    Ht 5' 3\" (1.6 m)    Wt 78.2 kg (172 lb 6 oz)    SpO2 100%    BMI 30.53 kg/m2       Patient is status post Procedure(s):   ABDOMINAL SUPRACERVICAL HYSTERECTOMY, LEFT SALPINGO OOPHORECTOMY, RIGHT SALPINGECTOMY. Nausea/Vomiting: Controlled. Postoperative hydration reviewed and adequate. Pain:  Pain Scale 1: Numeric (0 - 10) (07/12/17 1339)  Pain Intensity 1: 3 (07/12/17 1339)   Managed. Neurological Status:   Neuro (WDL): Within Defined Limits (07/12/17 1333)   At baseline. Mental Status and Level of Consciousness: Baseline and appropriate for discharge. Pulmonary Status:   O2 Device: Nasal cannula (07/12/17 1341)   Adequate oxygenation and airway patent. Complications related to anesthesia: None    Post-anesthesia assessment completed. No concerns. Patient has met all discharge requirements.     Signed By: Mook Wong MD    July 12, 2017

## 2017-07-12 NOTE — IP AVS SNAPSHOT
Current Discharge Medication List  
  
CONTINUE these medications which have NOT CHANGED Dose & Instructions Dispensing Information Comments Morning Noon Evening Bedtime B COMPLEX 1 tablet Generic drug:  b complex vitamins Your last dose was: Your next dose is:    
   
   
 Dose:  1 Tab Take 1 Tab by mouth daily. Refills:  0 Calcium-Cholecalciferol (D3) 600 mg(1,500mg) -400 unit Cap Your last dose was: Your next dose is: Take  by mouth two (2) days a week. Refills:  0 Iron 325 mg (65 mg iron) tablet Generic drug:  ferrous sulfate Your last dose was: Your next dose is: Take  by mouth Daily (before breakfast). Indications: IRON DEFICIENCY ANEMIA Refills:  0  
     
   
   
   
  
 VITAMIN C 250 mg tablet Generic drug:  ascorbic acid (vitamin C) Your last dose was: Your next dose is:    
   
   
 Dose:  150 mg Take 150 mg by mouth daily. Refills:  0

## 2017-07-13 LAB
BASOPHILS # BLD AUTO: 0 K/UL (ref 0–0.06)
BASOPHILS # BLD: 0 % (ref 0–2)
DIFFERENTIAL METHOD BLD: ABNORMAL
EOSINOPHIL # BLD: 0 K/UL (ref 0–0.4)
EOSINOPHIL NFR BLD: 0 % (ref 0–5)
ERYTHROCYTE [DISTWIDTH] IN BLOOD BY AUTOMATED COUNT: 17.2 % (ref 11.6–14.5)
HCT VFR BLD AUTO: 31.1 % (ref 35–45)
HGB BLD-MCNC: 10 G/DL (ref 12–16)
LYMPHOCYTES # BLD AUTO: 10 % (ref 21–52)
LYMPHOCYTES # BLD: 0.9 K/UL (ref 0.9–3.6)
MCH RBC QN AUTO: 28.1 PG (ref 24–34)
MCHC RBC AUTO-ENTMCNC: 32.2 G/DL (ref 31–37)
MCV RBC AUTO: 87.4 FL (ref 74–97)
MONOCYTES # BLD: 1 K/UL (ref 0.05–1.2)
MONOCYTES NFR BLD AUTO: 11 % (ref 3–10)
NEUTS SEG # BLD: 7.5 K/UL (ref 1.8–8)
NEUTS SEG NFR BLD AUTO: 79 % (ref 40–73)
PLATELET # BLD AUTO: 189 K/UL (ref 135–420)
PMV BLD AUTO: 9.1 FL (ref 9.2–11.8)
RBC # BLD AUTO: 3.56 M/UL (ref 4.2–5.3)
WBC # BLD AUTO: 9.4 K/UL (ref 4.6–13.2)

## 2017-07-13 PROCEDURE — 65270000029 HC RM PRIVATE

## 2017-07-13 PROCEDURE — 74011250636 HC RX REV CODE- 250/636: Performed by: OBSTETRICS & GYNECOLOGY

## 2017-07-13 PROCEDURE — 85025 COMPLETE CBC W/AUTO DIFF WBC: CPT | Performed by: OBSTETRICS & GYNECOLOGY

## 2017-07-13 PROCEDURE — 77010033678 HC OXYGEN DAILY

## 2017-07-13 PROCEDURE — 77030027138 HC INCENT SPIROMETER -A

## 2017-07-13 PROCEDURE — 36415 COLL VENOUS BLD VENIPUNCTURE: CPT | Performed by: OBSTETRICS & GYNECOLOGY

## 2017-07-13 RX ADMIN — SODIUM CHLORIDE 125 ML/HR: 900 INJECTION, SOLUTION INTRAVENOUS at 02:01

## 2017-07-13 RX ADMIN — CEFAZOLIN SODIUM 2 G: 2 SOLUTION INTRAVENOUS at 01:31

## 2017-07-13 RX ADMIN — SODIUM CHLORIDE 125 ML/HR: 900 INJECTION, SOLUTION INTRAVENOUS at 10:39

## 2017-07-13 RX ADMIN — KETOROLAC TROMETHAMINE 30 MG: 30 INJECTION, SOLUTION INTRAMUSCULAR at 08:51

## 2017-07-13 RX ADMIN — Medication 10 ML: at 14:00

## 2017-07-13 RX ADMIN — KETOROLAC TROMETHAMINE 30 MG: 30 INJECTION, SOLUTION INTRAMUSCULAR at 19:19

## 2017-07-13 RX ADMIN — KETOROLAC TROMETHAMINE 30 MG: 30 INJECTION, SOLUTION INTRAMUSCULAR at 02:02

## 2017-07-13 RX ADMIN — CEFAZOLIN SODIUM 2 G: 2 SOLUTION INTRAVENOUS at 10:45

## 2017-07-13 NOTE — OP NOTES
[address] [place] [address]  OPERATIVE REPORT    Name:  Neil Barnes  MR#:  [id number]  :  1965  Account #:  [de-identified]  Date of Adm:  2017  Date of Surgery:  2017      PREOPERATIVE DIAGNOSES  1. Fibroid uterus. 2. Refractory menorrhagia. 3. Perimenopausal.  4. Known stage 2 endometriosis patient. POSTOPERATIVE DIAGNOSES  1. Fibroid uterus. 2. Refractory menorrhagia. 3. Perimenopausal.  4. Stage 3 endometriosis. 5. Pelvic bowel adhesional disease. PROCEDURES PERFORMED  1. Enterolysis of bowel, pelvic adhesions. 2. Left salpingo-oophorectomy. 3. Total supracervical hysterectomy. 4. Right salpingectomy, as mentioned left salpingo-oophorectomy. COMPLICATIONS: None. TRANSFUSIONS: None. ESTIMATED BLOOD LOSS:  200 mL. ANESTHESIA: General.    PROPHYLAXIS ON BOARD: Ancef, Tagamet, pneumatic stockings. SPECIMENS REMOVED: uterus suprcervical lt s and o, rt salpinx    DESCRIPTION OF PROCEDURE: The patient is a 26-year-old   2, para 2 c section  female in with the above diagnosis. Prepared under adequate general endotracheal anesthesia in dorsal  lithotomy position, supine position as is routine for the procedure  above. Andrade catheter was placed. The patient was prepped and  draped. Timeout was accomplished and agreed upon. Anterior  abdominal wall was taken down sharply, coagulation of bleeders,  peritoneum was entered sharply without difficulty. Upon entry, we  noticed that there was significant posterior cul-de-sac and left sidewall  adherences to the perimesenteric area. This was freed up bluntly and  sharply. The gutters were freed.   The appendix was anteflexed without  irregularity, and  the right ovary was free and normal.  Utilizing a self-  retaining O'Oumar-O'Banks retractor system, and draping the  sidewalls with laps and placing three packs intra-abdominal to pack  away the anterior abdominal contents, utilizing the various retractors provided,  we got exposure. Next, utilizing delicate incision scissors and traction,  we took down the adherences to the left tube and ovary, which had  firm adherences to the perimesenteric area. This was not a  salvageable item. Once we freed this entirely up, we then took this left  tube and ovary, Kaylee clamping the infundibulopelvic ligament x2,  incising the pedicle, free tying it, suture ligation with #1 Chromic suture,  hemostasis was obtained. We took this all the way down to the  insertion of the tube and the suspensory ligament sharply. Next,  utilizing a Kaylee clamp over the lateral extent including the tube and  the ovary, and a Kocher proximally, we then severed between the two  clamps and sutured the uterine tissues with #1 Chromic suture. The  left tube and ovary were then submitted to Pathology. The left round  ligament was then identified and freed up, Kaylee clamped, incised  proximally, suture ligated in stick tie fashion #1 Chromic  suture, hemostatically tagged. Anterior broad ligament was taken  down sharply, a very firm, fixed bladder flap was taken down sharply,  down to the dimple of the cervix uteri. There was extensive firm  scarring at this area. There were no anterior problems with the  bladder. Posterior, there were firm fixations of the perimesenteric area  to the rectum area and the left side wall and uterosacral area. This  was taken down sharply and bluntly, but could not totally free up the  entire cervix on the left because of fixation of the bowel, as we had  previous D and C's that were specimen normal, we left this intact and  decided upon a supracervical hysterectomy. The uterine vascular tree was then clamped with Kaylee clamps on the  left, incised proximally, suture ligated with #1 Chromic suture. Hemostasis obtained. We took down the left side wall, then sharply.   On  the right side, we salvaged the right ovary, but the tube had a  hydrosalpinx. We took the tube separately, so as to save the  vascularity to the right ovary utilizing Kaylee clamp and coming down  the mesosalpinx, staying close to the tube, incising off the tube and  suture ligating and free tying with #1 Chromic suture. Next, the free  space between the suspensory ligament and pampiniform plexus, and the  insertion of the round ligament was identified. We clamped first Kocher  proximally and then a Kaylee clamp laterally. This pedicle incised  between the two clamps, suture ligated the distal pedicle with #1  Chromic suture. Hemostasis obtained. We then also suture ligated the  round ligament and tagged this with a #1 Chromic suture. We took  down the right sidewall with sharp dissection, after skeletonizing the  vascular trees. We then clamped the uterine vascular tree, incising off  the pedicle with scissors, stick tie fashion with #1 Chromic suture and  hemostasis obtained with the suture ligature. The epicardinal ligament  was the only one that we could free from the left side without going into  the bowel, or close to the bowel. The right sidewall we had freed  almost entirely. At this point, we determined that a supracervical hysterectomy would  be in the best interest of the patient, rather than potentially entering  into the rectosigmoid area. We came across the upper one-fourth of  the cervical area and detached the upper fundus of the uterus and the  upper segment of the cervix uteri and submitted this to Pathology. We  grasped the cervical area both anterior and posterior area with  Kocher's. Then with interrupted figure-of-eight #1 Chromic suture, we  hemostatically closed the transection site. Hemostasis was noted. There was no entry into the bladder and the urine was clear. At this  point, hemostasis had been obtained.   We then irrigated with irrigation  and after estimated blood loss of less than 200 mL, correct needle,  sponge and instrument count, we started the closure process. We placed Intercede over the cervical stump to prevent adhesions. We  closed the anterior abdominal wall after removal of the O'Oumar-  O'Banks retractor, and had a correct sponge count and needle count;  and closed the peritoneum with 0 Chromic suture running stitch. Closure of the fascia with #1 Chromic running and locked stitch. Subcutaneous closure with 2-0 Vicryl on a running stitch. A 3-0 Monocryl  for subcuticular stitch for closure of the incision. The patient was taken  to the recovery room in stable condition. COMPLICATIONS: None. FINDINGS: See operative summary.         Caden Penny MD    RS / SURAJ  D:  07/12/2017   11:11  T:  07/13/2017   06:45  Job #:  [id number]

## 2017-07-13 NOTE — WOUND CARE
Patient seen by wound care for hospital wide prevalence. No skin issues noted at this time. Wound care available if needed.

## 2017-07-13 NOTE — PROGRESS NOTES
Progress Note      Patient: Mitchell Dunbar               Sex: female          DOA: 7/12/2017         YOB: 1965      Age:  47 y.o.        LOS:  LOS: 1 day               Subjective:     No cc    Objective:      Visit Vitals    BP 98/57 (BP 1 Location: Left arm, BP Patient Position: At rest;Supine)    Pulse 88    Temp 98.3 °F (36.8 °C)    Resp 15    Ht 5' 3\" (1.6 m)    Wt 78.2 kg (172 lb 6 oz)    SpO2 100%    BMI 30.53 kg/m2       Physical Exam:  Pertinent items are noted in HPI. Intake and Output:  Current Shift:  07/12 1901 - 07/13 0700  In: 820.8 [I.V.:820.8]  Out: 1200 [Urine:1200]  Last three shifts:  07/11 0701 - 07/12 1900  In: 2900 [I.V.:2900]  Out: 1975 [MHXOT:2805]    Lab/Data Reviewed: All lab results for the last 24 hours reviewed. Medications Reviewed    Continued hospitalization is indicated due to pop siddharth      Assessment/Plan     Active Problems:    * No active hospital problems.  *      Resolving ilius    increase diet

## 2017-07-13 NOTE — PROGRESS NOTES
0746-report received from A Jason RN included BOBBY, MAR and Kardex. 0805-assessed pt resting in bed with IV and PCA. Transverse incision dry clean and intact. No c/o.  1400-No change in status, ambulating in hallway voiding in BR.  1550-report given to Diogo Mcpherson RN included BOBBY, MAR and Kardex.

## 2017-07-13 NOTE — ROUTINE PROCESS
Bedside and Verbal shift change report given to Lazara Andrew RN  (oncoming nurse) by Blanca Dominguez RN (offgoing nurse). Report included the following information SBAR, Kardex, OR Summary, Procedure Summary, Intake/Output, MAR, Accordion, Recent Results and Med Rec Status Options for questions were provided to the oncoming RN as well as the patient.

## 2017-07-13 NOTE — ROUTINE PROCESS
Bedside and Verbal shift change report given to VALERIE Borjas RN  (oncoming nurse) by  LITA Gomez RN  (offgoing nurse). Report included the following information SBAR, Kardex, OR Summary, Recent Results and Med Rec Status.

## 2017-07-13 NOTE — PROGRESS NOTES
SHIFT SUMMARY: Lower abdomen has some pinkness, but has not worsened during this short shift. Dressing intact. Pt has been bed rest. Andrade catheter in place. Pain level 3/10 using PCA and applying ice.

## 2017-07-13 NOTE — PROGRESS NOTES
Shift Summary :  Bed rest until 0625. Up with assistance in hallway after trying to void following dunlap removal (5637). Voided a small amount of urine with some vaginal bleeding. Tolerated ambulation well.

## 2017-07-13 NOTE — ROUTINE PROCESS
Bedside and Verbal shift change report given to LITA French RN (oncoming nurse) by Kurtis Estrella RN  (offgoing nurse). Report included the following information SBAR, Kardex, Intake/Output and MAR.

## 2017-07-14 VITALS
SYSTOLIC BLOOD PRESSURE: 120 MMHG | HEART RATE: 100 BPM | RESPIRATION RATE: 18 BRPM | OXYGEN SATURATION: 99 % | HEIGHT: 63 IN | DIASTOLIC BLOOD PRESSURE: 66 MMHG | BODY MASS INDEX: 30.54 KG/M2 | WEIGHT: 172.38 LBS | TEMPERATURE: 98.6 F

## 2017-07-14 PROCEDURE — 74011250636 HC RX REV CODE- 250/636: Performed by: OBSTETRICS & GYNECOLOGY

## 2017-07-14 PROCEDURE — 74011250637 HC RX REV CODE- 250/637: Performed by: OBSTETRICS & GYNECOLOGY

## 2017-07-14 RX ORDER — IBUPROFEN 400 MG/1
800 TABLET ORAL
Status: DISCONTINUED | OUTPATIENT
Start: 2017-07-14 | End: 2017-07-14 | Stop reason: HOSPADM

## 2017-07-14 RX ORDER — OXYCODONE AND ACETAMINOPHEN 5; 325 MG/1; MG/1
1-2 TABLET ORAL
Status: DISCONTINUED | OUTPATIENT
Start: 2017-07-14 | End: 2017-07-14 | Stop reason: HOSPADM

## 2017-07-14 RX ADMIN — IBUPROFEN 800 MG: 400 TABLET, FILM COATED ORAL at 15:22

## 2017-07-14 RX ADMIN — Medication 5 ML: at 14:00

## 2017-07-14 RX ADMIN — OXYCODONE AND ACETAMINOPHEN 2 TABLET: 5; 325 TABLET ORAL at 12:47

## 2017-07-14 RX ADMIN — OXYCODONE AND ACETAMINOPHEN 2 TABLET: 5; 325 TABLET ORAL at 07:44

## 2017-07-14 RX ADMIN — HYDROMORPHONE HYDROCHLORIDE: 10 INJECTION, SOLUTION INTRAMUSCULAR; INTRAVENOUS; SUBCUTANEOUS at 00:00

## 2017-07-14 RX ADMIN — KETOROLAC TROMETHAMINE 30 MG: 30 INJECTION, SOLUTION INTRAMUSCULAR at 06:14

## 2017-07-14 RX ADMIN — SODIUM CHLORIDE, SODIUM LACTATE, POTASSIUM CHLORIDE, AND CALCIUM CHLORIDE 125 ML/HR: 600; 310; 30; 20 INJECTION, SOLUTION INTRAVENOUS at 03:00

## 2017-07-14 NOTE — PROGRESS NOTES
Noted pt has been ambulating in the halls. No plan of care needs have been identified. Anticipate pt will be medically stable for discharge with in the next 24-36 hours.   CM remains available to assist.

## 2017-07-14 NOTE — PROGRESS NOTES
0030 Ambulated to bathroom then in hallway, encouraged PCA use for pain control. Voiding clear yellow urine. Noted jaziel pad with dried brown discharge - pad changed. Reminded on use of IS, pt using independently    Assisted to bathroom, PCA checked. Ice packs to abdomen     0623 Ambulated to bathroom with assist. Toradol given for pain prior to ambulation. No vaginal discharge noted. CHG wipes done, gown changed    9134 Received phone order from attending MD to D/C PCA and start on oral pain meds - see orders. 0730 Shift summary: Pt is up with assist, PCA stopped and given Percocet as ordered for pain. Ambulated in hallway last nigh, encouraged to ambulate this am in hallway again. an have Motrin as needed every 8 hrs as ordered. Voiding in toilet, no BM BS active.  Pt informed that MD will be in around 1 pm. Shift uneventful

## 2017-07-14 NOTE — DISCHARGE INSTRUCTIONS
Abdominal Hysterectomy: What to Expect at [address] [place] [address] can expect to feel better and stronger each day, although you may need pain medicine for a week or two. You may get tired easily or have less energy than usual. This may last for several weeks after surgery. You will probably notice that your belly is swollen and puffy. This is common. The swelling will take several weeks to go down. It may take about 4 to 6 weeks to fully recover. It is important to avoid lifting while you are recovering so that you can heal.  This care sheet gives you a general idea about how long it will take for you to recover. But each person recovers at a different pace. Follow the steps below to get better as quickly as possible. How can you care for yourself at home? Activity  · Rest when you feel tired. Getting enough sleep will help you recover. · Try to walk each day. Start by walking a little more than you did the day before. Bit by bit, increase the amount you walk. Walking boosts blood flow and helps prevent pneumonia and constipation. · Avoid lifting anything that would make you strain. This may include a child, heavy grocery bags and milk containers, a heavy briefcase or backpack, cat litter or dog food bags, or a vacuum . · Avoid strenuous activities, such as biking, jogging, weight lifting, or aerobic exercise, until your doctor says it is okay. · You may shower. Pat the cut (incision) dry. Do not take a bath for the first 2 weeks, or until your doctor tells you it is okay. · Ask your doctor when you can drive again. · You will probably need to take 2 to 4 weeks off from work. It depends on the type of work you do and how you feel. · Your doctor will tell you when you can have sex again. Diet  · You can eat your normal diet. If your stomach is upset, try bland, low-fat foods like plain rice, broiled chicken, toast, and yogurt.   · Drink plenty of fluids (unless your doctor tells you not to).  · You may notice that your bowel movements are not regular right after your surgery. This is common. Try to avoid constipation and straining with bowel movements. You may want to take a fiber supplement every day. If you have not had a bowel movement after a couple of days, ask your doctor about taking a mild laxative. Medicines  · Your doctor will tell you if and when you can restart your medicines. He or she will also give you instructions about taking any new medicines. · If you take blood thinners, such as warfarin (Coumadin), clopidogrel (Plavix), or aspirin, be sure to talk to your doctor. He or she will tell you if and when to start taking those medicines again. Make sure that you understand exactly what your doctor wants you to do. · Be safe with medicines. Take pain medicines exactly as directed. ¨ If the doctor gave you a prescription medicine for pain, take it as prescribed. ¨ If you are not taking a prescription pain medicine, ask your doctor if you can take an over-the-counter medicine. · If your doctor prescribed antibiotics, take them as directed. Do not stop taking them just because you feel better. You need to take the full course of antibiotics. · If you think your pain medicine is making you sick to your stomach:  ¨ Take your medicine after meals (unless your doctor has told you not to). ¨ Ask your doctor for a different pain medicine. Incision care  · If you have strips of tape on the cut (incision) the doctor made, leave the tape on for a week or until it falls off. Or follow your doctor's instructions for removing the tape. · Wash the area daily with warm, soapy water, and pat it dry. Don't use hydrogen peroxide or alcohol, which can slow healing. You may cover the area with a gauze bandage if it weeps or rubs against clothing. Change the bandage every day. · Keep the area clean and dry. Other instructions  · You may have some light vaginal bleeding.  Wear sanitary pads if needed. Do not douche or use tampons. Follow-up care is a key part of your treatment and safety. Be sure to make and go to all appointments, and call your doctor if you are having problems. It's also a good idea to know your test results and keep a list of the medicines you take. When should you call for help? Call 911 anytime you think you may need emergency care. For example, call if:  · You passed out (lost consciousness). · You have sudden chest pain and shortness of breath, or you cough up blood. · You have severe pain in your belly. Call your doctor now or seek immediate medical care if:  · You have bright red vaginal bleeding that soaks one or more pads in an hour, or you have large clots. · You have foul-smelling discharge from your vagina. · You are sick to your stomach or cannot keep fluids down. · You have signs of infection, such as:  ¨ Increased pain, swelling, warmth, or redness. ¨ Red streaks leading from the incision. ¨ Pus draining from the incision. ¨ A fever. · You have pain that does not get better after you take pain medicine. · You have loose stitches, or your incision comes open. · You have signs of a blood clot, such as:  ¨ Pain in your calf, back of knee, thigh, or groin. ¨ Redness and swelling in your leg or groin. · You have trouble passing urine or stool, especially if you have pain or swelling in your lower belly. · You have hot flashes, sweating, flushing, or a fast or pounding heartbeat. Watch closely for changes in your health, and be sure to contact your doctor if:  · You do not have a bowel movement after taking a laxative. Where can you learn more? Go to http://christopher-aida.info/. Enter M280 in the search box to learn more about \"Abdominal Hysterectomy: What to Expect at Home. \"  Current as of: October 13, 2016  Content Version: 11.3  © 9279-7564 [employer].  Care instructions adapted under license by [employer] (which disclaims liability or warranty for this information). If you have questions about a medical condition or this instruction, always ask your healthcare professional. [address] any warranty or liability for your use of this information.

## 2017-07-14 NOTE — DISCHARGE SUMMARY
Discharge Summary    Patient: Ha Servin               Sex: female          DOA: 7/12/2017         YOB: 1965      Age:  47 y.o.        LOS:  LOS: 2 days                Admit Date: 7/12/2017    Discharge Date: 7/14/2017    Admission Diagnoses: DYSFUNCTIONAL UTERINE BLEEDING,PELVIC PAIN,FIBROID UTERUS  Fibroid uterus    Discharge Diagnoses:    Problem List as of 7/14/2017  Date Reviewed: 7/14/2017          Codes Class Noted - Resolved    RESOLVED: Endometriosis of pelvis (Chronic) ICD-10-CM: N80.3  ICD-9-CM: 617.3  7/11/2017 - 7/12/2017        RESOLVED: Endometrial polyp (Chronic) ICD-10-CM: N84.0  ICD-9-CM: 621.0  7/11/2017 - 7/12/2017        RESOLVED: Fibroid uterus ICD-10-CM: D25.9  ICD-9-CM: 218.9  3/31/2017 - 7/12/2017        RESOLVED: DUB (dysfunctional uterine bleeding) ICD-10-CM: N93.8  ICD-9-CM: 626.8  3/30/2017 - 3/31/2017        RESOLVED: Menorrhagia ICD-10-CM: N92.0  ICD-9-CM: 626.2  3/30/2017 - 7/12/2017              Discharge Condition: Good    Hospital Course: b9    Consults: o    Activity: See surgical instructions    Diet: Regular Diet    Wound Care: As directed    Follow-up: 2 and 6 wk pop check hospital summary dictated # [id number]

## 2017-07-14 NOTE — PROGRESS NOTES
Shift summary:     Pt. Up walking the hallway with  and staff, tolerating well. Dressing to abdomen clean/dry/intact. Pain managed per PCA. Up voiding clear yellow urine. Eating fairly well, no complaints of nausea. Scd's in use.      Patient Vitals for the past 12 hrs:   Temp Pulse Resp BP SpO2   07/13/17 2018 98.8 °F (37.1 °C) 96 18 117/69 99 %   07/13/17 1617 - - - - 100 %   07/13/17 1601 98.4 °F (36.9 °C) 85 18 112/63 100 %

## 2017-07-14 NOTE — ROUTINE PROCESS
Dual AVS reviewed with MEGA Cheema RN. All medications reviewed individually with patient. Opportunities for questions and concerns provided. Patient discharged via (mode of transport ie. Car, ambulance or air transport) car  Patient's arm band appropriately discarded.       Patient armband removed and shredded

## 2017-07-14 NOTE — ROUTINE PROCESS
Bedside and Verbal shift change report given to JAMES Mosher RN (oncoming nurse) by Miriam Emmanuel   (offgoing nurse). Report included the following information SBAR, Kardex and MAR.

## 2017-07-14 NOTE — ROUTINE PROCESS
Bedside and Verbal shift change report given to Octavia Amezquita RN (oncoming nurse) by Abelino Miner RN   (offgoing nurse). Report included the following information SBAR, Kardex, Procedure Summary, Intake/Output, MAR and Recent Results.

## 2017-07-15 NOTE — DISCHARGE SUMMARY
[facility] SUMMARY    Name:  Josey Ramsey  MR#:  [id number]  :  1965  Account #:  [de-identified]  Date of Adm:  2017  Date of Discharge:  2017      DATE AND TIME OF THIS ROUND AND DISCHARGE NOTE:  2017, time 2 p.m. Admission date is 2017. ADMISSION DIAGNOSES:  1. Known endometriosis patient. 2. Refractory dysfunctional uterine bleeding and fibroid uterus. POSTOPERATIVE AND DISCHARGE DIAGNOSES:  1. Known endometriosis patient. 2. Refractory dysfunctional uterine bleeding and fibroid uterus. 1. Known stage III endometriosis and also status postoperative  abdominal supracervical hysterectomy, left salpingo-oophorectomy  and right salpingectomy discharge. PATHOLOGY: Pending. COMPLICATIONS: None. TRANSFUSIONS: None. The patient is a 26-year-old  2, para 2 by caesarean section,   female in with the above diagnoses. Her dictated summary  done on 2017 at 12:30 p.m. as #[id number], constitute the  admission history and physical.    Admission laboratory data at that time was 36, 11.8, white count of  6,000, platelet count 956,015, serum pregnancy test negative, O  positive blood type, negative screen, complete metabolic profile  normal. Urinalysis normal. Nonreactive VDRL. HOSPITAL COURSE: Patient taken to the operative suite at 11:15  a.m. on 2017 where extensive left side wall and cul-de-sac  adherences, including some of the perimesenteric area of the bowel  were found and a left ovarian cyst also bound down to the  perimesenteric area. There was extensive enterolysis perimesenteric  and left side wall adhesiolysis freed up at the left   ovary which was  taken. Because of the adherences and the firmness to the  endometriosis adherences in the left uterosacral, a supracervical was  decided upon so as to avoid injury to the present bowel, even though  most of it was perimesenteric.  There was initial blood loss of about 200  mL. Also there was a right hydrosalpinx which was also removed. The  right ovary appeared to be normal. The patient was on Ancef coverage. Postoperatively patient did well, was advanced to a regular diet by her  second postoperative day, ambulating well, placed on p.o. Percocet and  p.r.n. Motrin and Tylenol for pain management which she tolerated well  and was ambulating well with normal micturition and good GI function  and bowel sounds are normal. Currently the patient's CBC yesterday,  postop day #1, was 31.1, see labs white count 9,400, platelet count nml. The  patient notes she is ready for discharge and will be discharged this  evening. Has her Percocet at home that is 325/5, #20, 1 p.o. q. 4 hours  p.r.n., intermittent she may utilize Motrin and Tylenol to assist in  managing the discomfort without too much of narcotics. The patient will  be given the appropriate postoperative instructions along with her   and daughter who are in attendance with her today in the  room, and do understand well the limitations on their mother and wife,  and they understand the postoperative instructions as does the patient. Dressing has been changed, the wound is clean. Gave her dressings of  4 x 4's. The patient has her 2 and 6-week postoperative appointments  already set for the office. Again, pathology on the supracervical  hysterectomy, left salpingo-oophorectomy and right salpingectomy are  pending.         MD AVERY Waters / Sai Rangel  D:  07/14/2017   14:09  T:  07/14/2017   20:04  Job #:  [id number]

## (undated) DEVICE — ABDOMINAL PAD: Brand: DERMACEA

## (undated) DEVICE — SUT VCRL + 1 36IN CT1 VIO --

## (undated) DEVICE — TELFA NON-ADHERENT ABSORBENT DRESSING: Brand: TELFA

## (undated) DEVICE — MAJOR: Brand: MEDLINE INDUSTRIES, INC.

## (undated) DEVICE — LIGHT HANDLE: Brand: DEVON

## (undated) DEVICE — SUT CHRMC 1 36IN CT1 BRN --

## (undated) DEVICE — GOWN,AURORA,NONRNF,XL,30/CS: Brand: MEDLINE

## (undated) DEVICE — PACK,LITHOTOMY,PK IV,AURORA: Brand: MEDLINE

## (undated) DEVICE — SUT MCRYL + 3-0 27IN PS1 UD --

## (undated) DEVICE — SUT VCRL + 2-0 36IN CT1 UD --

## (undated) DEVICE — CATH URETH INTMIT ROB 14FR FUN -- USE ITEM 179521

## (undated) DEVICE — D&C HYSTEROSCOPY: Brand: MEDLINE INDUSTRIES, INC.

## (undated) DEVICE — SKIN MARKER,REGULAR TIP WITH RULER AND LABELS: Brand: DEVON

## (undated) DEVICE — MATERNITY PAD,HEAVY: Brand: CURITY

## (undated) DEVICE — SOL IRRIGATION INJ NACL 0.9% 500ML BTL

## (undated) DEVICE — CATHETERIZATION TRAY 16 FR FOL DRAINAGE BG LUBRI-SIL IC

## (undated) DEVICE — SPONGE LAP 18X18IN STRL -- 5/PK

## (undated) DEVICE — TRAY PREP DRY W/ PREM GLV 2 APPL 6 SPNG 2 UNDPD 1 OVERWRAP

## (undated) DEVICE — GAUZE SPONGES,12 PLY: Brand: CURITY

## (undated) DEVICE — INTENDED FOR TISSUE SEPARATION, AND OTHER PROCEDURES THAT REQUIRE A SHARP SURGICAL BLADE TO PUNCTURE OR CUT.: Brand: BARD-PARKER ® CARBON RIB-BACK BLADES

## (undated) DEVICE — 3L THIN WALL CAN: Brand: CRD

## (undated) DEVICE — GAUZE,SPONGE,8"X4",12PLY,XRAY,STRL,LF: Brand: MEDLINE

## (undated) DEVICE — BARRIER TISS ADH ABSRB 3X4IN -- GYNECARE INTERCEED

## (undated) DEVICE — DEVON™ KNEE AND BODY STRAP 60" X 3" (1.5 M X 7.6 CM): Brand: DEVON

## (undated) DEVICE — SUTURE CHROMIC GUT SZ 2-0 L36IN ABSRB BRN L36MM CT-1 1/2 923H

## (undated) DEVICE — Z INACTIVE USE 2527070 DRAPE SURG W40XL44IN UNDERBUTTOCK SMS POLYPR W/ PCH BK DISP

## (undated) DEVICE — DRAPE TWL SURG 16X26IN BLU ORB04] ALLCARE INC]

## (undated) DEVICE — Device

## (undated) DEVICE — PAD,NON-ADHERENT,3X8,STERILE,LF,1/PK: Brand: MEDLINE